# Patient Record
Sex: FEMALE | Race: OTHER | HISPANIC OR LATINO | URBAN - METROPOLITAN AREA
[De-identification: names, ages, dates, MRNs, and addresses within clinical notes are randomized per-mention and may not be internally consistent; named-entity substitution may affect disease eponyms.]

---

## 2017-01-13 ENCOUNTER — EMERGENCY (EMERGENCY)
Facility: HOSPITAL | Age: 22
LOS: 0 days | Discharge: HOME | End: 2017-01-14
Admitting: EMERGENCY MEDICINE
Payer: COMMERCIAL

## 2017-01-13 DIAGNOSIS — S16.1XXA STRAIN OF MUSCLE, FASCIA AND TENDON AT NECK LEVEL, INITIAL ENCOUNTER: ICD-10-CM
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
## 2017-01-13 PROCEDURE — 99283 EMERGENCY DEPT VISIT LOW MDM: CPT

## 2017-01-30 ENCOUNTER — APPOINTMENT (OUTPATIENT)
Dept: SURGERY | Facility: HOSPITAL | Age: 22
End: 2017-01-30

## 2017-02-06 ENCOUNTER — APPOINTMENT (OUTPATIENT)
Dept: SURGERY | Facility: HOSPITAL | Age: 22
End: 2017-02-06

## 2017-02-06 ENCOUNTER — OUTPATIENT (OUTPATIENT)
Dept: OUTPATIENT SERVICES | Facility: HOSPITAL | Age: 22
LOS: 1 days | End: 2017-02-06
Payer: SELF-PAY

## 2017-02-06 VITALS
HEIGHT: 62 IN | RESPIRATION RATE: 14 BRPM | DIASTOLIC BLOOD PRESSURE: 74 MMHG | WEIGHT: 150 LBS | HEART RATE: 87 BPM | BODY MASS INDEX: 27.6 KG/M2 | SYSTOLIC BLOOD PRESSURE: 123 MMHG

## 2017-02-06 DIAGNOSIS — R10.0 ACUTE ABDOMEN: ICD-10-CM

## 2017-02-06 PROCEDURE — G0463: CPT

## 2017-02-07 DIAGNOSIS — K80.20 CALCULUS OF GALLBLADDER WITHOUT CHOLECYSTITIS WITHOUT OBSTRUCTION: ICD-10-CM

## 2017-05-03 ENCOUNTER — OUTPATIENT (OUTPATIENT)
Dept: OUTPATIENT SERVICES | Facility: HOSPITAL | Age: 22
LOS: 1 days | End: 2017-05-03
Payer: COMMERCIAL

## 2017-05-03 ENCOUNTER — APPOINTMENT (OUTPATIENT)
Dept: ULTRASOUND IMAGING | Facility: CLINIC | Age: 22
End: 2017-05-03

## 2017-05-03 DIAGNOSIS — Z00.8 ENCOUNTER FOR OTHER GENERAL EXAMINATION: ICD-10-CM

## 2017-05-03 PROCEDURE — 76830 TRANSVAGINAL US NON-OB: CPT

## 2017-05-03 PROCEDURE — 76856 US EXAM PELVIC COMPLETE: CPT

## 2017-06-21 ENCOUNTER — ASOB RESULT (OUTPATIENT)
Age: 22
End: 2017-06-21

## 2017-06-21 ENCOUNTER — APPOINTMENT (OUTPATIENT)
Dept: ANTEPARTUM | Facility: CLINIC | Age: 22
End: 2017-06-21

## 2017-07-27 ENCOUNTER — TRANSCRIPTION ENCOUNTER (OUTPATIENT)
Age: 22
End: 2017-07-27

## 2017-08-04 ENCOUNTER — TRANSCRIPTION ENCOUNTER (OUTPATIENT)
Age: 22
End: 2017-08-04

## 2017-08-15 ENCOUNTER — APPOINTMENT (OUTPATIENT)
Dept: ANTEPARTUM | Facility: CLINIC | Age: 22
End: 2017-08-15
Payer: MEDICAID

## 2017-08-15 ENCOUNTER — ASOB RESULT (OUTPATIENT)
Age: 22
End: 2017-08-15

## 2017-08-15 PROCEDURE — 76811 OB US DETAILED SNGL FETUS: CPT

## 2017-08-24 ENCOUNTER — OUTPATIENT (OUTPATIENT)
Dept: OUTPATIENT SERVICES | Age: 22
LOS: 1 days | Discharge: HOME | End: 2017-08-24

## 2017-08-28 ENCOUNTER — APPOINTMENT (OUTPATIENT)
Dept: PEDIATRIC CARDIOLOGY | Facility: CLINIC | Age: 22
End: 2017-08-28
Payer: MEDICAID

## 2017-08-28 PROCEDURE — 76827 ECHO EXAM OF FETAL HEART: CPT

## 2017-08-28 PROCEDURE — 76825 ECHO EXAM OF FETAL HEART: CPT

## 2017-08-28 PROCEDURE — 93325 DOPPLER ECHO COLOR FLOW MAPG: CPT

## 2017-10-10 ENCOUNTER — APPOINTMENT (OUTPATIENT)
Dept: ANTEPARTUM | Facility: CLINIC | Age: 22
End: 2017-10-10
Payer: MEDICAID

## 2017-10-10 ENCOUNTER — ASOB RESULT (OUTPATIENT)
Age: 22
End: 2017-10-10

## 2017-10-10 PROCEDURE — 76816 OB US FOLLOW-UP PER FETUS: CPT

## 2017-10-26 ENCOUNTER — OUTPATIENT (OUTPATIENT)
Dept: INPATIENT UNIT | Facility: HOSPITAL | Age: 22
LOS: 1 days | Discharge: HOME | End: 2017-10-26

## 2017-10-26 DIAGNOSIS — O47.9 FALSE LABOR, UNSPECIFIED: ICD-10-CM

## 2017-12-01 ENCOUNTER — APPOINTMENT (OUTPATIENT)
Dept: ANTEPARTUM | Facility: CLINIC | Age: 22
End: 2017-12-01
Payer: MEDICAID

## 2017-12-01 PROBLEM — Z00.00 ENCOUNTER FOR PREVENTIVE HEALTH EXAMINATION: Noted: 2017-12-01

## 2017-12-01 PROCEDURE — 76816 OB US FOLLOW-UP PER FETUS: CPT

## 2017-12-01 PROCEDURE — 76818 FETAL BIOPHYS PROFILE W/NST: CPT

## 2017-12-05 ENCOUNTER — APPOINTMENT (OUTPATIENT)
Dept: ANTEPARTUM | Facility: CLINIC | Age: 22
End: 2017-12-05
Payer: MEDICAID

## 2017-12-05 ENCOUNTER — ASOB RESULT (OUTPATIENT)
Age: 22
End: 2017-12-05

## 2017-12-05 PROCEDURE — 76818 FETAL BIOPHYS PROFILE W/NST: CPT

## 2017-12-08 ENCOUNTER — APPOINTMENT (OUTPATIENT)
Dept: ANTEPARTUM | Facility: CLINIC | Age: 22
End: 2017-12-08
Payer: MEDICAID

## 2017-12-08 PROCEDURE — 76818 FETAL BIOPHYS PROFILE W/NST: CPT

## 2017-12-11 ENCOUNTER — INPATIENT (INPATIENT)
Facility: HOSPITAL | Age: 22
LOS: 2 days | Discharge: HOME | End: 2017-12-14
Attending: OBSTETRICS & GYNECOLOGY | Admitting: OBSTETRICS & GYNECOLOGY

## 2017-12-11 VITALS — HEIGHT: 63 IN | WEIGHT: 174.17 LBS

## 2017-12-11 LAB
ALBUMIN SERPL ELPH-MCNC: 2.4 G/DL — LOW (ref 3.3–5)
ALP SERPL-CCNC: 185 U/L — HIGH (ref 40–120)
ALT FLD-CCNC: 20 U/L — SIGNIFICANT CHANGE UP (ref 12–78)
ANION GAP SERPL CALC-SCNC: 7 MMOL/L — SIGNIFICANT CHANGE UP (ref 5–17)
AST SERPL-CCNC: 17 U/L — SIGNIFICANT CHANGE UP (ref 15–37)
BASOPHILS # BLD AUTO: 0 K/UL — SIGNIFICANT CHANGE UP (ref 0–0.2)
BASOPHILS NFR BLD AUTO: 0.4 % — SIGNIFICANT CHANGE UP (ref 0–2)
BILIRUB SERPL-MCNC: 0.3 MG/DL — SIGNIFICANT CHANGE UP (ref 0.2–1.2)
BLD GP AB SCN SERPL QL: SIGNIFICANT CHANGE UP
BUN SERPL-MCNC: 9 MG/DL — SIGNIFICANT CHANGE UP (ref 7–23)
CALCIUM SERPL-MCNC: 9 MG/DL — SIGNIFICANT CHANGE UP (ref 8.5–10.1)
CHLORIDE SERPL-SCNC: 107 MMOL/L — SIGNIFICANT CHANGE UP (ref 96–108)
CO2 SERPL-SCNC: 24 MMOL/L — SIGNIFICANT CHANGE UP (ref 22–31)
CREAT SERPL-MCNC: 0.56 MG/DL — SIGNIFICANT CHANGE UP (ref 0.5–1.3)
EOSINOPHIL # BLD AUTO: 0.1 K/UL — SIGNIFICANT CHANGE UP (ref 0–0.5)
EOSINOPHIL NFR BLD AUTO: 0.5 % — SIGNIFICANT CHANGE UP (ref 0–6)
GLUCOSE SERPL-MCNC: 88 MG/DL — SIGNIFICANT CHANGE UP (ref 70–99)
HCT VFR BLD CALC: 36 % — SIGNIFICANT CHANGE UP (ref 34.5–45)
HGB BLD-MCNC: 11.7 G/DL — SIGNIFICANT CHANGE UP (ref 11.5–15.5)
LYMPHOCYTES # BLD AUTO: 2.8 K/UL — SIGNIFICANT CHANGE UP (ref 1–3.3)
LYMPHOCYTES # BLD AUTO: 26.3 % — SIGNIFICANT CHANGE UP (ref 13–44)
MCHC RBC-ENTMCNC: 27.6 PG — SIGNIFICANT CHANGE UP (ref 27–34)
MCHC RBC-ENTMCNC: 32.4 GM/DL — SIGNIFICANT CHANGE UP (ref 32–36)
MCV RBC AUTO: 85.1 FL — SIGNIFICANT CHANGE UP (ref 80–100)
MONOCYTES # BLD AUTO: 0.4 K/UL — SIGNIFICANT CHANGE UP (ref 0–0.9)
MONOCYTES NFR BLD AUTO: 3.3 % — SIGNIFICANT CHANGE UP (ref 2–14)
NEUTROPHILS # BLD AUTO: 7.5 K/UL — HIGH (ref 1.8–7.4)
NEUTROPHILS NFR BLD AUTO: 69.5 % — SIGNIFICANT CHANGE UP (ref 43–77)
PLATELET # BLD AUTO: 332 K/UL — SIGNIFICANT CHANGE UP (ref 150–400)
POTASSIUM SERPL-MCNC: 4.8 MMOL/L — SIGNIFICANT CHANGE UP (ref 3.5–5.3)
POTASSIUM SERPL-SCNC: 4.8 MMOL/L — SIGNIFICANT CHANGE UP (ref 3.5–5.3)
PROT SERPL-MCNC: 6.7 GM/DL — SIGNIFICANT CHANGE UP (ref 6–8.3)
RBC # BLD: 4.24 M/UL — SIGNIFICANT CHANGE UP (ref 3.8–5.2)
RBC # FLD: 14 % — SIGNIFICANT CHANGE UP (ref 10.3–14.5)
SODIUM SERPL-SCNC: 138 MMOL/L — SIGNIFICANT CHANGE UP (ref 135–145)
TYPE + AB SCN PNL BLD: SIGNIFICANT CHANGE UP
WBC # BLD: 10.8 K/UL — HIGH (ref 3.8–10.5)
WBC # FLD AUTO: 10.8 K/UL — HIGH (ref 3.8–10.5)

## 2017-12-11 RX ORDER — SODIUM CHLORIDE 9 MG/ML
500 INJECTION, SOLUTION INTRAVENOUS ONCE
Qty: 0 | Refills: 0 | Status: COMPLETED | OUTPATIENT
Start: 2017-12-11 | End: 2017-12-11

## 2017-12-11 RX ORDER — SODIUM CHLORIDE 9 MG/ML
1000 INJECTION, SOLUTION INTRAVENOUS
Qty: 0 | Refills: 0 | Status: DISCONTINUED | OUTPATIENT
Start: 2017-12-11 | End: 2017-12-12

## 2017-12-11 RX ORDER — OXYTOCIN 10 UNIT/ML
333.33 VIAL (ML) INJECTION
Qty: 20 | Refills: 0 | Status: DISCONTINUED | OUTPATIENT
Start: 2017-12-11 | End: 2017-12-12

## 2017-12-11 RX ORDER — CITRIC ACID/SODIUM CITRATE 300-500 MG
15 SOLUTION, ORAL ORAL EVERY 4 HOURS
Qty: 0 | Refills: 0 | Status: DISCONTINUED | OUTPATIENT
Start: 2017-12-11 | End: 2017-12-12

## 2017-12-11 RX ADMIN — SODIUM CHLORIDE 1000 MILLILITER(S): 9 INJECTION, SOLUTION INTRAVENOUS at 20:17

## 2017-12-11 NOTE — PATIENT PROFILE OB - VAGINAL DELIVERY TYPE, BABY A
Return to work    10/5/2017      RE:    Dale Mendosa   [address]  [facility] [address]      This is to certify that Dale was seen in the clinic today and can return to regular work on 10-05-17.    Restrictions: none        Signature: __________________________________________________, 10/5/2017              Justin Grant MD  [facility]  [address]  [facility] [id number] [phone]     spontaneous

## 2017-12-12 LAB
ABO RH CONFIRMATION: SIGNIFICANT CHANGE UP
T PALLIDUM AB TITR SER: NEGATIVE — SIGNIFICANT CHANGE UP

## 2017-12-12 RX ORDER — DIBUCAINE 1 %
1 OINTMENT (GRAM) RECTAL EVERY 4 HOURS
Qty: 0 | Refills: 0 | Status: DISCONTINUED | OUTPATIENT
Start: 2017-12-12 | End: 2017-12-12

## 2017-12-12 RX ORDER — SODIUM CHLORIDE 9 MG/ML
1000 INJECTION, SOLUTION INTRAVENOUS
Qty: 0 | Refills: 0 | Status: DISCONTINUED | OUTPATIENT
Start: 2017-12-12 | End: 2017-12-12

## 2017-12-12 RX ORDER — SODIUM CHLORIDE 9 MG/ML
3 INJECTION INTRAMUSCULAR; INTRAVENOUS; SUBCUTANEOUS EVERY 8 HOURS
Qty: 0 | Refills: 0 | Status: DISCONTINUED | OUTPATIENT
Start: 2017-12-12 | End: 2017-12-12

## 2017-12-12 RX ORDER — HYDROCORTISONE 1 %
1 OINTMENT (GRAM) TOPICAL EVERY 4 HOURS
Qty: 0 | Refills: 0 | Status: DISCONTINUED | OUTPATIENT
Start: 2017-12-12 | End: 2017-12-12

## 2017-12-12 RX ORDER — PRAMOXINE HYDROCHLORIDE 150 MG/15G
1 AEROSOL, FOAM RECTAL EVERY 4 HOURS
Qty: 0 | Refills: 0 | Status: DISCONTINUED | OUTPATIENT
Start: 2017-12-12 | End: 2017-12-14

## 2017-12-12 RX ORDER — SODIUM CHLORIDE 9 MG/ML
3 INJECTION INTRAMUSCULAR; INTRAVENOUS; SUBCUTANEOUS EVERY 8 HOURS
Qty: 0 | Refills: 0 | Status: DISCONTINUED | OUTPATIENT
Start: 2017-12-12 | End: 2017-12-14

## 2017-12-12 RX ORDER — MAGNESIUM HYDROXIDE 400 MG/1
30 TABLET, CHEWABLE ORAL
Qty: 0 | Refills: 0 | Status: DISCONTINUED | OUTPATIENT
Start: 2017-12-12 | End: 2017-12-14

## 2017-12-12 RX ORDER — ACETAMINOPHEN 500 MG
650 TABLET ORAL EVERY 6 HOURS
Qty: 0 | Refills: 0 | Status: DISCONTINUED | OUTPATIENT
Start: 2017-12-12 | End: 2017-12-12

## 2017-12-12 RX ORDER — LANOLIN
1 OINTMENT (GRAM) TOPICAL EVERY 6 HOURS
Qty: 0 | Refills: 0 | Status: DISCONTINUED | OUTPATIENT
Start: 2017-12-12 | End: 2017-12-14

## 2017-12-12 RX ORDER — DIBUCAINE 1 %
1 OINTMENT (GRAM) RECTAL EVERY 4 HOURS
Qty: 0 | Refills: 0 | Status: DISCONTINUED | OUTPATIENT
Start: 2017-12-12 | End: 2017-12-14

## 2017-12-12 RX ORDER — OXYTOCIN 10 UNIT/ML
41.67 VIAL (ML) INJECTION
Qty: 20 | Refills: 0 | Status: DISCONTINUED | OUTPATIENT
Start: 2017-12-12 | End: 2017-12-12

## 2017-12-12 RX ORDER — HYDROCORTISONE 1 %
1 OINTMENT (GRAM) TOPICAL EVERY 4 HOURS
Qty: 0 | Refills: 0 | Status: DISCONTINUED | OUTPATIENT
Start: 2017-12-12 | End: 2017-12-14

## 2017-12-12 RX ORDER — IBUPROFEN 200 MG
600 TABLET ORAL EVERY 6 HOURS
Qty: 0 | Refills: 0 | Status: DISCONTINUED | OUTPATIENT
Start: 2017-12-12 | End: 2017-12-12

## 2017-12-12 RX ORDER — DOCUSATE SODIUM 100 MG
100 CAPSULE ORAL
Qty: 0 | Refills: 0 | Status: DISCONTINUED | OUTPATIENT
Start: 2017-12-12 | End: 2017-12-14

## 2017-12-12 RX ORDER — IBUPROFEN 200 MG
600 TABLET ORAL EVERY 6 HOURS
Qty: 0 | Refills: 0 | Status: DISCONTINUED | OUTPATIENT
Start: 2017-12-12 | End: 2017-12-14

## 2017-12-12 RX ORDER — AER TRAVELER 0.5 G/1
1 SOLUTION RECTAL; TOPICAL EVERY 4 HOURS
Qty: 0 | Refills: 0 | Status: DISCONTINUED | OUTPATIENT
Start: 2017-12-12 | End: 2017-12-14

## 2017-12-12 RX ORDER — DIPHENHYDRAMINE HCL 50 MG
25 CAPSULE ORAL EVERY 6 HOURS
Qty: 0 | Refills: 0 | Status: DISCONTINUED | OUTPATIENT
Start: 2017-12-12 | End: 2017-12-14

## 2017-12-12 RX ORDER — ACETAMINOPHEN 500 MG
650 TABLET ORAL EVERY 6 HOURS
Qty: 0 | Refills: 0 | Status: DISCONTINUED | OUTPATIENT
Start: 2017-12-12 | End: 2017-12-14

## 2017-12-12 RX ORDER — TETANUS TOXOID, REDUCED DIPHTHERIA TOXOID AND ACELLULAR PERTUSSIS VACCINE, ADSORBED 5; 2.5; 8; 8; 2.5 [IU]/.5ML; [IU]/.5ML; UG/.5ML; UG/.5ML; UG/.5ML
0.5 SUSPENSION INTRAMUSCULAR ONCE
Qty: 0 | Refills: 0 | Status: DISCONTINUED | OUTPATIENT
Start: 2017-12-12 | End: 2017-12-14

## 2017-12-12 RX ORDER — SIMETHICONE 80 MG/1
80 TABLET, CHEWABLE ORAL EVERY 6 HOURS
Qty: 0 | Refills: 0 | Status: DISCONTINUED | OUTPATIENT
Start: 2017-12-12 | End: 2017-12-14

## 2017-12-12 RX ORDER — PRAMOXINE HYDROCHLORIDE 150 MG/15G
1 AEROSOL, FOAM RECTAL EVERY 4 HOURS
Qty: 0 | Refills: 0 | Status: DISCONTINUED | OUTPATIENT
Start: 2017-12-12 | End: 2017-12-12

## 2017-12-12 RX ORDER — AER TRAVELER 0.5 G/1
1 SOLUTION RECTAL; TOPICAL EVERY 4 HOURS
Qty: 0 | Refills: 0 | Status: DISCONTINUED | OUTPATIENT
Start: 2017-12-12 | End: 2017-12-12

## 2017-12-12 RX ORDER — OXYTOCIN 10 UNIT/ML
41.67 VIAL (ML) INJECTION
Qty: 20 | Refills: 0 | Status: DISCONTINUED | OUTPATIENT
Start: 2017-12-12 | End: 2017-12-14

## 2017-12-12 RX ORDER — GLYCERIN ADULT
1 SUPPOSITORY, RECTAL RECTAL AT BEDTIME
Qty: 0 | Refills: 0 | Status: DISCONTINUED | OUTPATIENT
Start: 2017-12-12 | End: 2017-12-14

## 2017-12-12 RX ADMIN — Medication 100 MILLIGRAM(S): at 22:20

## 2017-12-12 RX ADMIN — Medication 600 MILLIGRAM(S): at 22:20

## 2017-12-12 RX ADMIN — SODIUM CHLORIDE 125 MILLILITER(S): 9 INJECTION, SOLUTION INTRAVENOUS at 04:23

## 2017-12-13 LAB
BASOPHILS # BLD AUTO: 0 K/UL — SIGNIFICANT CHANGE UP (ref 0–0.2)
BASOPHILS NFR BLD AUTO: 0.3 % — SIGNIFICANT CHANGE UP (ref 0–2)
EOSINOPHIL # BLD AUTO: 0.1 K/UL — SIGNIFICANT CHANGE UP (ref 0–0.5)
EOSINOPHIL NFR BLD AUTO: 0.6 % — SIGNIFICANT CHANGE UP (ref 0–6)
HCT VFR BLD CALC: 28.7 % — LOW (ref 34.5–45)
HGB BLD-MCNC: 9.3 G/DL — LOW (ref 11.5–15.5)
LYMPHOCYTES # BLD AUTO: 2.1 K/UL — SIGNIFICANT CHANGE UP (ref 1–3.3)
LYMPHOCYTES # BLD AUTO: 21.6 % — SIGNIFICANT CHANGE UP (ref 13–44)
MCHC RBC-ENTMCNC: 27.8 PG — SIGNIFICANT CHANGE UP (ref 27–34)
MCHC RBC-ENTMCNC: 32.3 GM/DL — SIGNIFICANT CHANGE UP (ref 32–36)
MCV RBC AUTO: 86.1 FL — SIGNIFICANT CHANGE UP (ref 80–100)
MONOCYTES # BLD AUTO: 0.5 K/UL — SIGNIFICANT CHANGE UP (ref 0–0.9)
MONOCYTES NFR BLD AUTO: 5.3 % — SIGNIFICANT CHANGE UP (ref 2–14)
NEUTROPHILS # BLD AUTO: 7.1 K/UL — SIGNIFICANT CHANGE UP (ref 1.8–7.4)
NEUTROPHILS NFR BLD AUTO: 72.1 % — SIGNIFICANT CHANGE UP (ref 43–77)
PLATELET # BLD AUTO: 239 K/UL — SIGNIFICANT CHANGE UP (ref 150–400)
RBC # BLD: 3.33 M/UL — LOW (ref 3.8–5.2)
RBC # FLD: 14.2 % — SIGNIFICANT CHANGE UP (ref 10.3–14.5)
WBC # BLD: 9.9 K/UL — SIGNIFICANT CHANGE UP (ref 3.8–10.5)
WBC # FLD AUTO: 9.9 K/UL — SIGNIFICANT CHANGE UP (ref 3.8–10.5)

## 2017-12-13 RX ADMIN — Medication 1 SUPPOSITORY(S): at 18:20

## 2017-12-13 RX ADMIN — Medication 1 TABLET(S): at 12:37

## 2017-12-13 RX ADMIN — Medication 100 MILLIGRAM(S): at 12:42

## 2017-12-13 NOTE — PROGRESS NOTE ADULT - SUBJECTIVE AND OBJECTIVE BOX
S: Pt is a 23 yo F, now  who is PPD #1 s/p . Pt is overall doing well. Reports to be OOB and ambulating without difficulty. Pt is voiding and passing flatus without difficulty, has not had a BM yet. Tolerating regular diet and pain is controlled with medications. Pt is breastfeeding and bottlefeeding.  Denies h/a, dizziness, fevers, changes in vision, CP, palpitations, SOB, n/v/d, or calf tenderness. No other complaints at this time.     O: Vital Signs Last 24 Hrs  T(C): 37.1 (12 Dec 2017 20:52), Max: 37.2 (12 Dec 2017 16:49)  T(F): 98.7 (12 Dec 2017 20:52), Max: 99 (12 Dec 2017 16:49)  HR: 95 (12 Dec 2017 20:52) (75 - 116)  BP: 114/64 (12 Dec 2017 20:52) (105/62 - 128/76)  BP(mean): 96 (12 Dec 2017 16:49) (77 - 96)  RR: 18 (12 Dec 2017 20:52) (16 - 18)  SpO2: 98% (12 Dec 2017 20:52) (98% - 99%)    PE:   Gen: WD/WN, resting comfortably in bed in NAD  Head: NC/AT  Heart: RRR, clear S1 S2, no murmurs, rubs or gallops  Respiratory: lungs CTA bilaterally, no wheezes, rales or rhonchi   Abd: soft, NT, ND, fundus firm below umbilicus  Ext: no tenderness, no LE edema, +pulses     Labs:                        11.7   10.8  )-----------( 332      ( 11 Dec 2017 20:58 )             36.0       A: 21yo F Stable PPD#1 s/p    normal day 1 exam  VSS  doing well  routine post partum care  if clinically stable, d/c for tomorrow

## 2017-12-14 ENCOUNTER — TRANSCRIPTION ENCOUNTER (OUTPATIENT)
Age: 22
End: 2017-12-14

## 2017-12-14 VITALS
OXYGEN SATURATION: 100 % | HEART RATE: 84 BPM | SYSTOLIC BLOOD PRESSURE: 119 MMHG | TEMPERATURE: 98 F | RESPIRATION RATE: 18 BRPM | DIASTOLIC BLOOD PRESSURE: 71 MMHG

## 2017-12-14 RX ORDER — ACETAMINOPHEN 500 MG
2 TABLET ORAL
Qty: 0 | Refills: 0 | DISCHARGE
Start: 2017-12-14

## 2017-12-14 RX ORDER — IBUPROFEN 200 MG
1 TABLET ORAL
Qty: 0 | Refills: 0 | DISCHARGE
Start: 2017-12-14

## 2017-12-14 RX ADMIN — Medication 100 MILLIGRAM(S): at 08:15

## 2017-12-14 NOTE — DISCHARGE NOTE OB - MEDICATION SUMMARY - MEDICATIONS TO TAKE
I will START or STAY ON the medications listed below when I get home from the hospital:    ibuprofen 600 mg oral tablet  -- 1 tab(s) by mouth every 6 hours, As needed, Moderate Pain  -- Indication: For pain    acetaminophen 325 mg oral tablet  -- 2 tab(s) by mouth every 6 hours, As needed, Mild Pain  -- Indication: For pain    acetaminophen 325 mg oral tablet  -- 2 tab(s) by mouth every 6 hours, As needed, For Temp greater than 38.5 C (101.3 F)  -- Indication: For fever

## 2017-12-14 NOTE — DISCHARGE NOTE OB - PATIENT PORTAL LINK FT
“You can access the FollowHealth Patient Portal, offered by [facility], by registering with the following website: [email or web address]”

## 2017-12-14 NOTE — DISCHARGE NOTE OB - CARE PROVIDER_API CALL
Viviana Medrano), Obstetrics and Gynecology  [address] [place] [address]  Phone: [phone]  Fax: [phone]

## 2017-12-14 NOTE — PROGRESS NOTE ADULT - SUBJECTIVE AND OBJECTIVE BOX
S: Pt is a 21 yo F, now  who is PPD#2 s/p . Pt is overall doing well. Reports to be OOB and ambulating without difficulty. Pt is voiding and passing flatus without difficulty, and states she had a BM yesterday. Tolerating regular diet and pain is controlled with medications. Pt is breastfeeding and bottlefeeding. Denies h/a, dizziness, fevers, changes in vision, CP, palpitations, SOB, n/v/d or calf tenderness. No other complaints at this time.    O: Vital Signs Last 24 Hrs  T(C): 37 (13 Dec 2017 20:00), Max: 37 (13 Dec 2017 20:00)  T(F): 98.6 (13 Dec 2017 20:00), Max: 98.6 (13 Dec 2017 20:00)  HR: 80 (13 Dec 2017 20:00) (80 - 91)  BP: 101/51 (13 Dec 2017 20:00) (101/51 - 108/65)  RR: 18 (13 Dec 2017 20:00) (18 - 18)  SpO2: 100% (13 Dec 2017 20:00) (99% - 100%)    PE:   Gen: WD/WN, resting comfortably in bed in NAD  Head: NC/AT  Heart: RRR, clear S1 S2, no murmurs, rubs or gallops  Respiratory: lungs CTA bilaterally, no wheezes, rales or rhonchi   Abd: soft, NT, ND, fundus firm below umbilicus  Ext: no tenderness, no LE edema, +pulses     Labs:                        9.3    9.9   )-----------( 239      ( 13 Dec 2017 06:45 )             28.7     A/P: 23yo F Stable PPD#2 s/p    normal day 2 exam  VSS  doing well  routine post partum care  stable for discharge

## 2017-12-14 NOTE — DISCHARGE NOTE OB - HOSPITAL COURSE
21 yo F, now  was admitted on  for induction of labor at 37 2/7 WGA due to intrahepatic cholestasis of pregnancy. Delivered via  on  with no complications. Patient is now postpartum day 2, without complicated hospital course. Patient feels wells and is stable for discharge.

## 2017-12-14 NOTE — DISCHARGE NOTE OB - MATERIALS PROVIDED
[facility] Hearing Screen Program/  Immunization Record/Shaken Baby Prevention Handout/Breastfeeding Guide and Packet/Vaccinations/Guide to Postpartum Care/[facility] Screening Program/Back To Sleep Handout

## 2017-12-14 NOTE — DISCHARGE NOTE OB - CARE PLAN
Principal Discharge DX:	 (normal spontaneous vaginal delivery)  Goal:	resolution of symptoms and prevention of pain  Instructions for follow-up, activity and diet:	f/u with OB and pediatrician within 1 week

## 2017-12-19 DIAGNOSIS — K83.1 OBSTRUCTION OF BILE DUCT: ICD-10-CM

## 2017-12-19 DIAGNOSIS — Z3A.37 37 WEEKS GESTATION OF PREGNANCY: ICD-10-CM

## 2018-03-30 ENCOUNTER — EMERGENCY (EMERGENCY)
Facility: HOSPITAL | Age: 23
LOS: 0 days | Discharge: HOME | End: 2018-03-30
Attending: EMERGENCY MEDICINE
Payer: MEDICAID

## 2018-03-30 VITALS
OXYGEN SATURATION: 100 % | HEIGHT: 62 IN | SYSTOLIC BLOOD PRESSURE: 126 MMHG | RESPIRATION RATE: 16 BRPM | DIASTOLIC BLOOD PRESSURE: 70 MMHG | TEMPERATURE: 98 F | HEART RATE: 82 BPM | WEIGHT: 162.92 LBS

## 2018-03-30 DIAGNOSIS — Z87.19 PERSONAL HISTORY OF OTHER DISEASES OF THE DIGESTIVE SYSTEM: ICD-10-CM

## 2018-03-30 DIAGNOSIS — R11.2 NAUSEA WITH VOMITING, UNSPECIFIED: ICD-10-CM

## 2018-03-30 DIAGNOSIS — R10.10 UPPER ABDOMINAL PAIN, UNSPECIFIED: ICD-10-CM

## 2018-03-30 DIAGNOSIS — K80.50 CALCULUS OF BILE DUCT WITHOUT CHOLANGITIS OR CHOLECYSTITIS WITHOUT OBSTRUCTION: ICD-10-CM

## 2018-03-30 LAB
ALBUMIN SERPL ELPH-MCNC: 3.5 G/DL — SIGNIFICANT CHANGE UP (ref 3.3–5)
ALP SERPL-CCNC: 68 U/L — SIGNIFICANT CHANGE UP (ref 40–120)
ALT FLD-CCNC: 34 U/L — SIGNIFICANT CHANGE UP (ref 12–78)
ANION GAP SERPL CALC-SCNC: 7 MMOL/L — SIGNIFICANT CHANGE UP (ref 5–17)
APPEARANCE UR: CLEAR — SIGNIFICANT CHANGE UP
AST SERPL-CCNC: 20 U/L — SIGNIFICANT CHANGE UP (ref 15–37)
BACTERIA # UR AUTO: (no result)
BASOPHILS # BLD AUTO: 0.03 K/UL — SIGNIFICANT CHANGE UP (ref 0–0.2)
BASOPHILS NFR BLD AUTO: 0.3 % — SIGNIFICANT CHANGE UP (ref 0–2)
BILIRUB SERPL-MCNC: 0.2 MG/DL — SIGNIFICANT CHANGE UP (ref 0.2–1.2)
BILIRUB UR-MCNC: NEGATIVE — SIGNIFICANT CHANGE UP
BUN SERPL-MCNC: 11 MG/DL — SIGNIFICANT CHANGE UP (ref 7–23)
CALCIUM SERPL-MCNC: 9.2 MG/DL — SIGNIFICANT CHANGE UP (ref 8.5–10.1)
CHLORIDE SERPL-SCNC: 106 MMOL/L — SIGNIFICANT CHANGE UP (ref 96–108)
CO2 SERPL-SCNC: 27 MMOL/L — SIGNIFICANT CHANGE UP (ref 22–31)
COLOR SPEC: YELLOW — SIGNIFICANT CHANGE UP
COMMENT - URINE: SIGNIFICANT CHANGE UP
CREAT SERPL-MCNC: 0.68 MG/DL — SIGNIFICANT CHANGE UP (ref 0.5–1.3)
DIFF PNL FLD: NEGATIVE — SIGNIFICANT CHANGE UP
EOSINOPHIL # BLD AUTO: 0.06 K/UL — SIGNIFICANT CHANGE UP (ref 0–0.5)
EOSINOPHIL NFR BLD AUTO: 0.6 % — SIGNIFICANT CHANGE UP (ref 0–6)
EPI CELLS # UR: (no result)
GLUCOSE SERPL-MCNC: 94 MG/DL — SIGNIFICANT CHANGE UP (ref 70–99)
GLUCOSE UR QL: NEGATIVE MG/DL — SIGNIFICANT CHANGE UP
HCG SERPL-ACNC: <1 MIU/ML — SIGNIFICANT CHANGE UP
HCT VFR BLD CALC: 37 % — SIGNIFICANT CHANGE UP (ref 34.5–45)
HGB BLD-MCNC: 12 G/DL — SIGNIFICANT CHANGE UP (ref 11.5–15.5)
IMM GRANULOCYTES NFR BLD AUTO: 0.2 % — SIGNIFICANT CHANGE UP (ref 0–1.5)
KETONES UR-MCNC: NEGATIVE — SIGNIFICANT CHANGE UP
LEUKOCYTE ESTERASE UR-ACNC: (no result)
LIDOCAIN IGE QN: 116 U/L — SIGNIFICANT CHANGE UP (ref 73–393)
LYMPHOCYTES # BLD AUTO: 3.43 K/UL — HIGH (ref 1–3.3)
LYMPHOCYTES # BLD AUTO: 34.2 % — SIGNIFICANT CHANGE UP (ref 13–44)
MCHC RBC-ENTMCNC: 27.8 PG — SIGNIFICANT CHANGE UP (ref 27–34)
MCHC RBC-ENTMCNC: 32.4 GM/DL — SIGNIFICANT CHANGE UP (ref 32–36)
MCV RBC AUTO: 85.8 FL — SIGNIFICANT CHANGE UP (ref 80–100)
MONOCYTES # BLD AUTO: 0.6 K/UL — SIGNIFICANT CHANGE UP (ref 0–0.9)
MONOCYTES NFR BLD AUTO: 6 % — SIGNIFICANT CHANGE UP (ref 2–14)
NEUTROPHILS # BLD AUTO: 5.88 K/UL — SIGNIFICANT CHANGE UP (ref 1.8–7.4)
NEUTROPHILS NFR BLD AUTO: 58.7 % — SIGNIFICANT CHANGE UP (ref 43–77)
NITRITE UR-MCNC: NEGATIVE — SIGNIFICANT CHANGE UP
NRBC # BLD: 0 /100 WBCS — SIGNIFICANT CHANGE UP (ref 0–0)
PH UR: 5 — SIGNIFICANT CHANGE UP (ref 5–8)
PLATELET # BLD AUTO: 349 K/UL — SIGNIFICANT CHANGE UP (ref 150–400)
POTASSIUM SERPL-MCNC: 3.8 MMOL/L — SIGNIFICANT CHANGE UP (ref 3.5–5.3)
POTASSIUM SERPL-SCNC: 3.8 MMOL/L — SIGNIFICANT CHANGE UP (ref 3.5–5.3)
PROT SERPL-MCNC: 7.8 GM/DL — SIGNIFICANT CHANGE UP (ref 6–8.3)
PROT UR-MCNC: NEGATIVE MG/DL — SIGNIFICANT CHANGE UP
RBC # BLD: 4.31 M/UL — SIGNIFICANT CHANGE UP (ref 3.8–5.2)
RBC # FLD: 14.3 % — SIGNIFICANT CHANGE UP (ref 10.3–14.5)
RBC CASTS # UR COMP ASSIST: SIGNIFICANT CHANGE UP /HPF (ref 0–4)
SODIUM SERPL-SCNC: 140 MMOL/L — SIGNIFICANT CHANGE UP (ref 135–145)
SP GR SPEC: 1.01 — SIGNIFICANT CHANGE UP (ref 1.01–1.02)
UROBILINOGEN FLD QL: NEGATIVE MG/DL — SIGNIFICANT CHANGE UP
WBC # BLD: 10.02 K/UL — SIGNIFICANT CHANGE UP (ref 3.8–10.5)
WBC # FLD AUTO: 10.02 K/UL — SIGNIFICANT CHANGE UP (ref 3.8–10.5)
WBC UR QL: SIGNIFICANT CHANGE UP

## 2018-03-30 PROCEDURE — 99284 EMERGENCY DEPT VISIT MOD MDM: CPT

## 2018-03-30 PROCEDURE — 76705 ECHO EXAM OF ABDOMEN: CPT | Mod: 26

## 2018-03-30 RX ORDER — ONDANSETRON 8 MG/1
4 TABLET, FILM COATED ORAL ONCE
Qty: 0 | Refills: 0 | Status: COMPLETED | OUTPATIENT
Start: 2018-03-30 | End: 2018-03-30

## 2018-03-30 RX ORDER — SODIUM CHLORIDE 9 MG/ML
3 INJECTION INTRAMUSCULAR; INTRAVENOUS; SUBCUTANEOUS ONCE
Qty: 0 | Refills: 0 | Status: COMPLETED | OUTPATIENT
Start: 2018-03-30 | End: 2018-03-30

## 2018-03-30 RX ORDER — SODIUM CHLORIDE 9 MG/ML
1000 INJECTION INTRAMUSCULAR; INTRAVENOUS; SUBCUTANEOUS ONCE
Qty: 0 | Refills: 0 | Status: COMPLETED | OUTPATIENT
Start: 2018-03-30 | End: 2018-03-30

## 2018-03-30 RX ORDER — FAMOTIDINE 10 MG/ML
20 INJECTION INTRAVENOUS ONCE
Qty: 0 | Refills: 0 | Status: COMPLETED | OUTPATIENT
Start: 2018-03-30 | End: 2018-03-30

## 2018-03-30 RX ADMIN — SODIUM CHLORIDE 1000 MILLILITER(S): 9 INJECTION INTRAMUSCULAR; INTRAVENOUS; SUBCUTANEOUS at 21:31

## 2018-03-30 RX ADMIN — SODIUM CHLORIDE 3 MILLILITER(S): 9 INJECTION INTRAMUSCULAR; INTRAVENOUS; SUBCUTANEOUS at 21:31

## 2018-03-30 RX ADMIN — FAMOTIDINE 20 MILLIGRAM(S): 10 INJECTION INTRAVENOUS at 21:30

## 2018-03-30 RX ADMIN — ONDANSETRON 4 MILLIGRAM(S): 8 TABLET, FILM COATED ORAL at 21:30

## 2018-03-30 NOTE — ED STATDOCS - PROGRESS NOTE DETAILS
Patient seen and evaluated.  Symtpoms have completely resolved at this time, abdomen is soft, NT, ND.  Reviewed all lab and sono findings with patient, no signs of an acute abdomen.  Reviewed with patient risks of gallstones and importance of outpatient follow up with a surgeon as well as strict return precautions for severe pain, fever and vomiting.  Patient verbalized understanding and agrees to follow up -Dalton Jones PA-C,

## 2018-03-30 NOTE — ED STATDOCS - ATTENDING CONTRIBUTION TO CARE
I, Ede Jansen MD, personally saw the patient with the PA, and completed the key components of the history and physical exam. I then discussed the management plan with the PA.

## 2018-03-30 NOTE — ED ADULT NURSE NOTE - OBJECTIVE STATEMENT
diffuse upper abd pain. states pmhx gallstones & was told to f/u with surgery but then became pregnant. Currently 3 months postpartum, not breastfeeding

## 2018-03-30 NOTE — ED STATDOCS - OBJECTIVE STATEMENT
21 y/o female with a PMHx of gallstones presents to the ED c/o severe abd pain, nausea, vomiting at 7:00pm tonight. Pt ate dinner at 5:30pm and then had suddent onset of severe abd pain, N/V. Pain is in upper abdomen, described as burning and radiates to the back. No dysuria, hematuria. Movement, walking makes pain worse. Pain feels similar to previous gallstone pain. NKDA. PMD: [facility]. 21 y/o female with a PMHx of gallstones presents to the ED c/o severe abd pain, nausea, vomiting at 7:00pm tonight. Pt ate dinner at 5:30pm and then had sudden onset of severe abd pain, N/V. Pain is in upper abdomen, described as burning and radiates to the back. No dysuria, hematuria. Movement, walking makes pain worse. Pain feels similar to previous gallstone pain. NKDA. PMD: [facility].

## 2018-03-30 NOTE — ED STATDOCS - CONSTITUTIONAL, MLM
normal...  female alert, +acutely ill due to pain, nausea, vomiting. Vomiting food material during evaluation.

## 2018-03-30 NOTE — ED STATDOCS - MEDICAL DECISION MAKING DETAILS
21 y/o  F h/o gallstones ambulatory to ED with upper abd pain, N/V 1-2 hours post meal. +RUQ epigastric tenderness, clinical Pritchard's.  Plan for labs, IV fluids, IV Zofran, Pepcid, consider IV pain meds, RUQ abdominal sono, observe and reassess.

## 2018-04-01 ENCOUNTER — OUTPATIENT (OUTPATIENT)
Dept: OUTPATIENT SERVICES | Facility: HOSPITAL | Age: 23
LOS: 1 days | End: 2018-04-01
Payer: MEDICAID

## 2018-04-01 PROCEDURE — G9001: CPT

## 2018-04-20 DIAGNOSIS — R69 ILLNESS, UNSPECIFIED: ICD-10-CM

## 2018-06-14 ENCOUNTER — RESULT REVIEW (OUTPATIENT)
Age: 23
End: 2018-06-14

## 2018-06-20 ENCOUNTER — OUTPATIENT (OUTPATIENT)
Dept: OUTPATIENT SERVICES | Facility: HOSPITAL | Age: 23
LOS: 1 days | End: 2018-06-20
Payer: MEDICAID

## 2018-06-20 ENCOUNTER — APPOINTMENT (OUTPATIENT)
Dept: ULTRASOUND IMAGING | Facility: CLINIC | Age: 23
End: 2018-06-20
Payer: MEDICAID

## 2018-06-20 DIAGNOSIS — Z00.8 ENCOUNTER FOR OTHER GENERAL EXAMINATION: ICD-10-CM

## 2018-06-20 PROCEDURE — 76641 ULTRASOUND BREAST COMPLETE: CPT | Mod: 26,RT

## 2018-06-20 PROCEDURE — 76641 ULTRASOUND BREAST COMPLETE: CPT

## 2018-07-17 PROBLEM — K80.20 CALCULUS OF GALLBLADDER WITHOUT CHOLECYSTITIS WITHOUT OBSTRUCTION: Chronic | Status: INACTIVE | Noted: 2018-03-30 | Resolved: 2018-04-04

## 2018-07-30 ENCOUNTER — TRANSCRIPTION ENCOUNTER (OUTPATIENT)
Age: 23
End: 2018-07-30

## 2019-03-06 ENCOUNTER — EMERGENCY (EMERGENCY)
Facility: HOSPITAL | Age: 24
LOS: 0 days | Discharge: HOME | End: 2019-03-06
Attending: STUDENT IN AN ORGANIZED HEALTH CARE EDUCATION/TRAINING PROGRAM | Admitting: STUDENT IN AN ORGANIZED HEALTH CARE EDUCATION/TRAINING PROGRAM
Payer: MEDICAID

## 2019-03-06 VITALS
DIASTOLIC BLOOD PRESSURE: 73 MMHG | HEART RATE: 79 BPM | HEIGHT: 62 IN | WEIGHT: 169.98 LBS | TEMPERATURE: 98 F | SYSTOLIC BLOOD PRESSURE: 125 MMHG | OXYGEN SATURATION: 99 % | RESPIRATION RATE: 15 BRPM

## 2019-03-06 VITALS — WEIGHT: 169.98 LBS | HEIGHT: 62 IN

## 2019-03-06 DIAGNOSIS — S16.1XXA STRAIN OF MUSCLE, FASCIA AND TENDON AT NECK LEVEL, INITIAL ENCOUNTER: ICD-10-CM

## 2019-03-06 DIAGNOSIS — M54.2 CERVICALGIA: ICD-10-CM
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
## 2019-03-06 PROCEDURE — 99284 EMERGENCY DEPT VISIT MOD MDM: CPT | Mod: 25

## 2019-03-06 RX ORDER — KETOROLAC TROMETHAMINE 30 MG/ML
30 SYRINGE (ML) INJECTION ONCE
Qty: 0 | Refills: 0 | Status: DISCONTINUED | OUTPATIENT
Start: 2019-03-06 | End: 2019-03-06

## 2019-03-06 RX ADMIN — Medication 30 MILLIGRAM(S): at 07:56

## 2019-03-06 NOTE — ED PROVIDER NOTE - MUSCULOSKELETAL, MLM
Spine appears normal, range of motion is not limited, (+) mild right sided paravertebral cervical tenderness;

## 2019-03-06 NOTE — ED PROVIDER NOTE - CPE EDP MUSC NORM
After Visit Summary   10/22/2017    Angel Sanders    MRN: [id number]           Patient Information     Date Of Birth          1951        Visit Information        Provider Department      10/22/2017 4:40 PM Ashley Gann APRN CNP [facility]        Today's Diagnoses     Acute maxillary sinusitis, recurrence not specified    -  1      Care Instructions      Sinusitis (Antibiotic Treatment)    The sinuses are air-filled spaces within the bones of the face. They connect to the inside of the nose. Sinusitis is an inflammation of the tissue lining the sinus cavity. Sinus inflammation can occur during a cold. It can also be due to allergies to pollens and other particles in the air. Sinusitis can cause symptoms of sinus congestion and fullness. A sinus infection causes fever, headache and facial pain. There is often green or yellow drainage from the nose or into the back of the throat (post-nasal drip). You have been given antibiotics to treat this condition.  Home care:    Take the full course of antibiotics as instructed. Do not stop taking them, even if you feel better.    Drink plenty of water, hot tea, and other liquids. This may help thin mucus. It also may promote sinus drainage.    Heat may help soothe painful areas of the face. Use a towel soaked in hot water. Or,  the shower and direct the hot spray onto your face. Using a vaporizer along with a menthol rub at night may also help.     An expectorant containing guaifenesin may help thin the mucus and promote drainage from the sinuses.    Over-the-counter decongestants may be used unless a similar medicine was prescribed. Nasal sprays work the fastest. Use one that contains phenylephrine or oxymetazoline. First blow the nose gently. Then use the spray. Do not use these medicines more often than directed on the label or symptoms may get worse. You may also use tablets containing pseudoephedrine.  Avoid products that combine ingredients, because side effects may be increased. Read labels. You can also ask the pharmacist for help. (NOTE: Persons with high blood pressure should not use decongestants. They can raise blood pressure.)    Over-the-counter antihistamines may help if allergies contributed to your sinusitis.      Do not use nasal rinses or irrigation during an acute sinus infection, unless told to by your health care provider. Rinsing may spread the infection to other sinuses.    Use acetaminophen or ibuprofen to control pain, unless another pain medicine was prescribed. (If you have chronic liver or kidney disease or ever had a stomach ulcer, talk with your doctor before using these medicines. Aspirin should never be used in anyone under 18 years of age who is ill with a fever. It may cause severe liver damage.)    Don't smoke. This can worsen symptoms.  Follow-up care  Follow up with your healthcare provider or our staff if you are not improving within the next week.  When to seek medical advice  Call your healthcare provider if any of these occur:    Facial pain or headache becoming more severe    Stiff neck    Unusual drowsiness or confusion    Swelling of the forehead or eyelids    Vision problems, including blurred or double vision    Fever of 100.4 F (38 C) or higher, or as directed by your healthcare provider    Seizure    Breathing problems    Symptoms not resolving within 10 days  Date Last Reviewed: 4/13/2015 2000-2017 [employer]. [address] [place] [address], [place] [address]. All rights reserved. This information is not intended as a substitute for professional medical care. Always follow your healthcare professional's instructions.                Follow-ups after your visit        Who to contact     If you have questions or need follow up information about today's clinic visit or your schedule please contact [facility] directly at  "[phone].  Normal or non-critical lab and imaging results will be communicated to you by [employer], letter or phone within 4 business days after the clinic has received the results. If you do not hear from us within 7 days, please contact the clinic through [employer] or phone. If you have a critical or abnormal lab result, we will notify you by phone as soon as possible.  Submit refill requests through [employer] or call your pharmacy and they will forward the refill request to us. Please allow 3 business days for your refill to be completed.          Additional Information About Your Visit        [employer] Information     [employer] lets you send messages to your doctor, view your test results, renew your prescriptions, schedule appointments and more. To sign up, go to [email or web address] . Click on \"Log in\" on the left side of the screen, which will take you to the Welcome page. Then click on \"Sign up Now\" on the right side of the page.     You will be asked to enter the access code listed below, as well as some personal information. Please follow the directions to create your username and password.     Your access code is: [email or web address]  Expires: 2018  5:34 PM     Your access code will  in 90 days. If you need help or a new code, please call your [facility] clinic or [phone].        Care EveryWhere ID     This is your Care EveryWhere ID. This could be used by other organizations to access your [facility] medical records  [id number]        Your Vitals Were     Pulse Temperature Pulse Oximetry BMI (Body Mass Index)          78 98.3  F (36.8  C) (Oral) 96% 36.25 kg/m2         Blood Pressure from Last 3 Encounters:   10/22/17 145/70   17 136/78   17 128/70    Weight from Last 3 Encounters:   10/22/17 211 lb 3.2 oz (95.8 kg)   17 208 lb (94.3 kg)   17 205 lb (93 kg)              Today, you had the following     No orders found for display         Today's Medication Changes        "   These changes are accurate as of: 10/22/17  5:34 PM.  If you have any questions, ask your nurse or doctor.               Start taking these medicines.        Dose/Directions    amoxicillin-clavulanate 875-125 MG per tablet   Commonly known as:  AUGMENTIN   Used for:  Acute maxillary sinusitis, recurrence not specified   Started by:  Ashley Gann APRN CNP        Dose:  1 tablet   Take 1 tablet by mouth 2 times daily   Quantity:  20 tablet   Refills:  0       trimethoprim-polymyxin b ophthalmic solution   Commonly known as:  POLYTRIM   Used for:  Acute maxillary sinusitis, recurrence not specified   Started by:  Ashley Gann APRN CNP        Dose:  1 drop   Apply 1 drop to eye every 3 hours for 7 days   Quantity:  1 Bottle   Refills:  0            Where to get your medicines      These medications were sent to [address] [address]     Phone:  [phone]     amoxicillin-clavulanate 875-125 MG per tablet    trimethoprim-polymyxin b ophthalmic solution                Primary Care Provider Office Phone # Fax #    Tony Peter [phone]       [address] [facility] [address]        Equal Access to Services     CUONG HUYNH AH: Hadii brendan ku hadasho Soomaali, waaxda luqadaha, qaybta kaalmada adeegyada, waxay idiin haytariqn rand mae. So [facility] [phone].    ATENCIÓN: Si habla español, tiene a perez disposición servicios gratuitos de asistencia lingüística. Llame al [phone].    We comply with applicable federal civil rights laws and [place] laws. We do not discriminate on the basis of race, color, national origin, age, disability, sex, sexual orientation, or gender identity.            Thank you!     Thank you for choosing [facility]  for your care. Our goal is always to provide you with excellent care. Hearing back from our patients is one  way we can continue to improve our services. Please take a few minutes to complete the written survey that you may receive in the mail after your visit with us. Thank you!             Your Updated Medication List - Protect others around you: Learn how to safely use, store and throw away your medicines at www.disposemymeds.org.          This list is accurate as of: 10/22/17  5:34 PM.  Always use your most recent med list.                   Brand Name Dispense Instructions for use Diagnosis    amoxicillin-clavulanate 875-125 MG per tablet    AUGMENTIN    20 tablet    Take 1 tablet by mouth 2 times daily    Acute maxillary sinusitis, recurrence not specified       aspirin 81 MG tablet      1 TABLET DAILY        atorvastatin 80 MG tablet    LIPITOR    90 tablet    Take 1 tablet (80 mg) by mouth daily    High cholesterol       folic acid-vit B6-vit B12 0.8-10-0.115 MG Tabs per tablet    FOLGARD     Take 1 tablet by mouth daily        IRON SUPPLEMENT PO      Take 325 mg by mouth daily (with breakfast)        lisinopril 5 MG tablet    PRINIVIL/ZESTRIL    90 tablet    Take 1 tablet (5 mg) by mouth daily    Essential hypertension, benign       methocarbamol 750 MG tablet    ROBAXIN    30 tablet    Take 1 tablet (750 mg) by mouth 3 times daily as needed for muscle spasms    Neck pain       metoprolol 25 MG tablet    LOPRESSOR    180 tablet    Take 1 tablet (25 mg) by mouth 2 times daily    Essential hypertension, benign       Multi-vitamin Tabs tablet      Take 1 tablet by mouth daily        nitroGLYcerin 0.4 MG sublingual tablet    NITROSTAT    25 tablet    Place 1 tablet (0.4 mg) under the tongue every 5 minutes as needed May repeat X 2 and if chest pain persists, call 911    Coronary atherosclerosis of unspecified type of vessel, native or graft       trimethoprim-polymyxin b ophthalmic solution    POLYTRIM    1 Bottle    Apply 1 drop to eye every 3 hours for 7 days    Acute maxillary sinusitis, recurrence not specified     normal...

## 2019-03-06 NOTE — ED ADULT NURSE NOTE - GENITOURINARY ASSESSMENT
WDL Awake/No adverse reaction to first time med in ED/Alert and oriented to person, place and time/Symptoms improved

## 2019-03-06 NOTE — ED PROVIDER NOTE - CLINICAL SUMMARY MEDICAL DECISION MAKING FREE TEXT BOX
22 yo female with right sided neck pain; atraumatic; no red flags; likely msk; patient denies pregnancy; toradol; warm compresses- given in ED; motrin 600mg tid prn pain; f/u with pmd in 1-2 days without fail; strict return precautions given.

## 2019-03-06 NOTE — ED PROVIDER NOTE - NEUROLOGICAL, MLM
Alert and oriented, no focal deficits, no motor or sensory deficits- strength 5/5 in ue/le bilaterally;

## 2019-03-06 NOTE — ED ADULT NURSE NOTE - IN THE PAST YEAR, HOW OFTEN HAVE YOU USED TOBACCO PRODUCTS?
Showering allowed/Walking-Outdoors allowed/Do not drive or operate machinery/Walking-Indoors allowed/No Heavy lifting/straining
Never

## 2019-03-06 NOTE — ED PROVIDER NOTE - OBJECTIVE STATEMENT
Patient is a 24 yo female with right sided neck pain since awakening this AM: patient reports pain with movement; no numbness or tingling- contrary to triage; no weakness; no headache; no nausea or vomiting; no fever or chills; no trauma or injury; did not take anything prior to arrival for pain.

## 2019-03-07 PROBLEM — K80.20 CALCULUS OF GALLBLADDER WITHOUT CHOLECYSTITIS WITHOUT OBSTRUCTION: Chronic | Status: ACTIVE | Noted: 2018-04-04

## 2019-07-24 ENCOUNTER — RESULT REVIEW (OUTPATIENT)
Age: 24
End: 2019-07-24

## 2019-12-02 ENCOUNTER — RESULT REVIEW (OUTPATIENT)
Age: 24
End: 2019-12-02

## 2019-12-02 ENCOUNTER — INPATIENT (INPATIENT)
Facility: HOSPITAL | Age: 24
LOS: 0 days | Discharge: HOME | DRG: 263 | End: 2019-12-03
Attending: SURGERY | Admitting: SURGERY
Payer: MEDICAID

## 2019-12-02 VITALS — HEIGHT: 62 IN | WEIGHT: 164.91 LBS

## 2019-12-02 DIAGNOSIS — K81.9 CHOLECYSTITIS, UNSPECIFIED: ICD-10-CM

## 2019-12-02 LAB
ALBUMIN SERPL ELPH-MCNC: 2.9 G/DL — LOW (ref 3.3–5)
ALP SERPL-CCNC: 75 U/L — SIGNIFICANT CHANGE UP (ref 40–120)
ALT FLD-CCNC: 16 U/L — SIGNIFICANT CHANGE UP (ref 12–78)
ANION GAP SERPL CALC-SCNC: 5 MMOL/L — SIGNIFICANT CHANGE UP (ref 5–17)
APPEARANCE UR: CLEAR — SIGNIFICANT CHANGE UP
APPEARANCE UR: CLEAR — SIGNIFICANT CHANGE UP
AST SERPL-CCNC: 8 U/L — LOW (ref 15–37)
BASE EXCESS BLDV CALC-SCNC: -0.4 MMOL/L — SIGNIFICANT CHANGE UP (ref -2–2)
BASOPHILS # BLD AUTO: 0.04 K/UL — SIGNIFICANT CHANGE UP (ref 0–0.2)
BASOPHILS NFR BLD AUTO: 0.2 % — SIGNIFICANT CHANGE UP (ref 0–2)
BILIRUB SERPL-MCNC: 0.2 MG/DL — SIGNIFICANT CHANGE UP (ref 0.2–1.2)
BILIRUB UR-MCNC: NEGATIVE — SIGNIFICANT CHANGE UP
BILIRUB UR-MCNC: NEGATIVE — SIGNIFICANT CHANGE UP
BUN SERPL-MCNC: 11 MG/DL — SIGNIFICANT CHANGE UP (ref 7–23)
CALCIUM SERPL-MCNC: 8.6 MG/DL — SIGNIFICANT CHANGE UP (ref 8.5–10.1)
CHLORIDE SERPL-SCNC: 109 MMOL/L — HIGH (ref 96–108)
CO2 SERPL-SCNC: 24 MMOL/L — SIGNIFICANT CHANGE UP (ref 22–31)
COLOR SPEC: YELLOW — SIGNIFICANT CHANGE UP
COLOR SPEC: YELLOW — SIGNIFICANT CHANGE UP
CREAT SERPL-MCNC: 0.75 MG/DL — SIGNIFICANT CHANGE UP (ref 0.5–1.3)
DIFF PNL FLD: ABNORMAL
DIFF PNL FLD: ABNORMAL
EOSINOPHIL # BLD AUTO: 0.02 K/UL — SIGNIFICANT CHANGE UP (ref 0–0.5)
EOSINOPHIL NFR BLD AUTO: 0.1 % — SIGNIFICANT CHANGE UP (ref 0–6)
GLUCOSE SERPL-MCNC: 107 MG/DL — HIGH (ref 70–99)
GLUCOSE UR QL: NEGATIVE MG/DL — SIGNIFICANT CHANGE UP
GLUCOSE UR QL: NEGATIVE MG/DL — SIGNIFICANT CHANGE UP
HCG SERPL-ACNC: <1 MIU/ML — SIGNIFICANT CHANGE UP
HCO3 BLDV-SCNC: 24 MMOL/L — SIGNIFICANT CHANGE UP (ref 21–29)
HCT VFR BLD CALC: 37.9 % — SIGNIFICANT CHANGE UP (ref 34.5–45)
HGB BLD-MCNC: 12.1 G/DL — SIGNIFICANT CHANGE UP (ref 11.5–15.5)
IMM GRANULOCYTES NFR BLD AUTO: 0.5 % — SIGNIFICANT CHANGE UP (ref 0–1.5)
KETONES UR-MCNC: NEGATIVE — SIGNIFICANT CHANGE UP
KETONES UR-MCNC: NEGATIVE — SIGNIFICANT CHANGE UP
LACTATE SERPL-SCNC: 0.8 MMOL/L — SIGNIFICANT CHANGE UP (ref 0.7–2)
LEUKOCYTE ESTERASE UR-ACNC: ABNORMAL
LEUKOCYTE ESTERASE UR-ACNC: NEGATIVE — SIGNIFICANT CHANGE UP
LIDOCAIN IGE QN: 53 U/L — LOW (ref 73–393)
LYMPHOCYTES # BLD AUTO: 1.36 K/UL — SIGNIFICANT CHANGE UP (ref 1–3.3)
LYMPHOCYTES # BLD AUTO: 6.3 % — LOW (ref 13–44)
MCHC RBC-ENTMCNC: 28 PG — SIGNIFICANT CHANGE UP (ref 27–34)
MCHC RBC-ENTMCNC: 31.9 GM/DL — LOW (ref 32–36)
MCV RBC AUTO: 87.7 FL — SIGNIFICANT CHANGE UP (ref 80–100)
MONOCYTES # BLD AUTO: 1 K/UL — HIGH (ref 0–0.9)
MONOCYTES NFR BLD AUTO: 4.7 % — SIGNIFICANT CHANGE UP (ref 2–14)
NEUTROPHILS # BLD AUTO: 18.97 K/UL — HIGH (ref 1.8–7.4)
NEUTROPHILS NFR BLD AUTO: 88.2 % — HIGH (ref 43–77)
NITRITE UR-MCNC: NEGATIVE — SIGNIFICANT CHANGE UP
NITRITE UR-MCNC: NEGATIVE — SIGNIFICANT CHANGE UP
PCO2 BLDV: 40 MMHG — SIGNIFICANT CHANGE UP (ref 35–50)
PH BLDV: 7.39 — SIGNIFICANT CHANGE UP (ref 7.35–7.45)
PH UR: 6 — SIGNIFICANT CHANGE UP (ref 5–8)
PH UR: 6 — SIGNIFICANT CHANGE UP (ref 5–8)
PLATELET # BLD AUTO: 289 K/UL — SIGNIFICANT CHANGE UP (ref 150–400)
PO2 BLDV: 53 MMHG — HIGH (ref 25–45)
POTASSIUM SERPL-MCNC: 3.8 MMOL/L — SIGNIFICANT CHANGE UP (ref 3.5–5.3)
POTASSIUM SERPL-SCNC: 3.8 MMOL/L — SIGNIFICANT CHANGE UP (ref 3.5–5.3)
PROT SERPL-MCNC: 7.2 GM/DL — SIGNIFICANT CHANGE UP (ref 6–8.3)
PROT UR-MCNC: 30 MG/DL
PROT UR-MCNC: NEGATIVE MG/DL — SIGNIFICANT CHANGE UP
RBC # BLD: 4.32 M/UL — SIGNIFICANT CHANGE UP (ref 3.8–5.2)
RBC # FLD: 13.2 % — SIGNIFICANT CHANGE UP (ref 10.3–14.5)
SAO2 % BLDV: 86 % — SIGNIFICANT CHANGE UP (ref 67–88)
SODIUM SERPL-SCNC: 138 MMOL/L — SIGNIFICANT CHANGE UP (ref 135–145)
SP GR SPEC: 1 — LOW (ref 1.01–1.02)
SP GR SPEC: 1.01 — SIGNIFICANT CHANGE UP (ref 1.01–1.02)
UROBILINOGEN FLD QL: NEGATIVE MG/DL — SIGNIFICANT CHANGE UP
UROBILINOGEN FLD QL: NEGATIVE MG/DL — SIGNIFICANT CHANGE UP
WBC # BLD: 21.5 K/UL — HIGH (ref 3.8–10.5)
WBC # FLD AUTO: 21.5 K/UL — HIGH (ref 3.8–10.5)

## 2019-12-02 PROCEDURE — 74177 CT ABD & PELVIS W/CONTRAST: CPT | Mod: 26

## 2019-12-02 PROCEDURE — C9113: CPT

## 2019-12-02 PROCEDURE — C1889: CPT

## 2019-12-02 PROCEDURE — 88304 TISSUE EXAM BY PATHOLOGIST: CPT

## 2019-12-02 PROCEDURE — 88304 TISSUE EXAM BY PATHOLOGIST: CPT | Mod: 26

## 2019-12-02 PROCEDURE — 76830 TRANSVAGINAL US NON-OB: CPT | Mod: 26

## 2019-12-02 PROCEDURE — 76705 ECHO EXAM OF ABDOMEN: CPT | Mod: 26

## 2019-12-02 RX ORDER — OXYCODONE AND ACETAMINOPHEN 5; 325 MG/1; MG/1
1 TABLET ORAL EVERY 4 HOURS
Refills: 0 | Status: DISCONTINUED | OUTPATIENT
Start: 2019-12-02 | End: 2019-12-03

## 2019-12-02 RX ORDER — SODIUM CHLORIDE 9 MG/ML
1000 INJECTION INTRAMUSCULAR; INTRAVENOUS; SUBCUTANEOUS ONCE
Refills: 0 | Status: COMPLETED | OUTPATIENT
Start: 2019-12-02 | End: 2019-12-02

## 2019-12-02 RX ORDER — PANTOPRAZOLE SODIUM 20 MG/1
40 TABLET, DELAYED RELEASE ORAL DAILY
Refills: 0 | Status: DISCONTINUED | OUTPATIENT
Start: 2019-12-02 | End: 2019-12-03

## 2019-12-02 RX ORDER — SODIUM CHLORIDE 9 MG/ML
500 INJECTION INTRAMUSCULAR; INTRAVENOUS; SUBCUTANEOUS ONCE
Refills: 0 | Status: COMPLETED | OUTPATIENT
Start: 2019-12-02 | End: 2019-12-02

## 2019-12-02 RX ORDER — SODIUM CHLORIDE 9 MG/ML
1000 INJECTION INTRAMUSCULAR; INTRAVENOUS; SUBCUTANEOUS
Refills: 0 | Status: DISCONTINUED | OUTPATIENT
Start: 2019-12-02 | End: 2019-12-03

## 2019-12-02 RX ORDER — ENOXAPARIN SODIUM 100 MG/ML
40 INJECTION SUBCUTANEOUS DAILY
Refills: 0 | Status: DISCONTINUED | OUTPATIENT
Start: 2019-12-02 | End: 2019-12-02

## 2019-12-02 RX ORDER — SODIUM CHLORIDE 9 MG/ML
1000 INJECTION INTRAMUSCULAR; INTRAVENOUS; SUBCUTANEOUS
Refills: 0 | Status: DISCONTINUED | OUTPATIENT
Start: 2019-12-02 | End: 2019-12-02

## 2019-12-02 RX ORDER — OXYCODONE HYDROCHLORIDE 5 MG/1
10 TABLET ORAL ONCE
Refills: 0 | Status: DISCONTINUED | OUTPATIENT
Start: 2019-12-02 | End: 2019-12-02

## 2019-12-02 RX ORDER — ACETAMINOPHEN 500 MG
1000 TABLET ORAL ONCE
Refills: 0 | Status: DISCONTINUED | OUTPATIENT
Start: 2019-12-02 | End: 2019-12-03

## 2019-12-02 RX ORDER — PIPERACILLIN AND TAZOBACTAM 4; .5 G/20ML; G/20ML
3.38 INJECTION, POWDER, LYOPHILIZED, FOR SOLUTION INTRAVENOUS ONCE
Refills: 0 | Status: COMPLETED | OUTPATIENT
Start: 2019-12-02 | End: 2019-12-02

## 2019-12-02 RX ORDER — ACETAMINOPHEN 500 MG
975 TABLET ORAL ONCE
Refills: 0 | Status: COMPLETED | OUTPATIENT
Start: 2019-12-02 | End: 2019-12-02

## 2019-12-02 RX ORDER — FENTANYL CITRATE 50 UG/ML
50 INJECTION INTRAVENOUS
Refills: 0 | Status: DISCONTINUED | OUTPATIENT
Start: 2019-12-02 | End: 2019-12-02

## 2019-12-02 RX ORDER — MORPHINE SULFATE 50 MG/1
2 CAPSULE, EXTENDED RELEASE ORAL EVERY 4 HOURS
Refills: 0 | Status: DISCONTINUED | OUTPATIENT
Start: 2019-12-02 | End: 2019-12-03

## 2019-12-02 RX ORDER — ONDANSETRON 8 MG/1
4 TABLET, FILM COATED ORAL EVERY 6 HOURS
Refills: 0 | Status: DISCONTINUED | OUTPATIENT
Start: 2019-12-02 | End: 2019-12-03

## 2019-12-02 RX ORDER — HYDROMORPHONE HYDROCHLORIDE 2 MG/ML
0.5 INJECTION INTRAMUSCULAR; INTRAVENOUS; SUBCUTANEOUS
Refills: 0 | Status: DISCONTINUED | OUTPATIENT
Start: 2019-12-02 | End: 2019-12-02

## 2019-12-02 RX ORDER — ONDANSETRON 8 MG/1
4 TABLET, FILM COATED ORAL ONCE
Refills: 0 | Status: COMPLETED | OUTPATIENT
Start: 2019-12-02 | End: 2019-12-02

## 2019-12-02 RX ORDER — PROCHLORPERAZINE MALEATE 5 MG
10 TABLET ORAL ONCE
Refills: 0 | Status: COMPLETED | OUTPATIENT
Start: 2019-12-02 | End: 2019-12-02

## 2019-12-02 RX ORDER — OXYCODONE AND ACETAMINOPHEN 5; 325 MG/1; MG/1
2 TABLET ORAL EVERY 4 HOURS
Refills: 0 | Status: DISCONTINUED | OUTPATIENT
Start: 2019-12-02 | End: 2019-12-03

## 2019-12-02 RX ADMIN — FENTANYL CITRATE 50 MICROGRAM(S): 50 INJECTION INTRAVENOUS at 19:08

## 2019-12-02 RX ADMIN — OXYCODONE HYDROCHLORIDE 10 MILLIGRAM(S): 5 TABLET ORAL at 19:11

## 2019-12-02 RX ADMIN — OXYCODONE HYDROCHLORIDE 10 MILLIGRAM(S): 5 TABLET ORAL at 19:46

## 2019-12-02 RX ADMIN — FENTANYL CITRATE 50 MICROGRAM(S): 50 INJECTION INTRAVENOUS at 18:37

## 2019-12-02 RX ADMIN — FENTANYL CITRATE 50 MICROGRAM(S): 50 INJECTION INTRAVENOUS at 20:13

## 2019-12-02 RX ADMIN — Medication 975 MILLIGRAM(S): at 09:27

## 2019-12-02 RX ADMIN — FENTANYL CITRATE 50 MICROGRAM(S): 50 INJECTION INTRAVENOUS at 18:15

## 2019-12-02 RX ADMIN — PIPERACILLIN AND TAZOBACTAM 200 GRAM(S): 4; .5 INJECTION, POWDER, LYOPHILIZED, FOR SOLUTION INTRAVENOUS at 12:18

## 2019-12-02 RX ADMIN — MORPHINE SULFATE 2 MILLIGRAM(S): 50 CAPSULE, EXTENDED RELEASE ORAL at 22:00

## 2019-12-02 RX ADMIN — SODIUM CHLORIDE 1000 MILLILITER(S): 9 INJECTION INTRAMUSCULAR; INTRAVENOUS; SUBCUTANEOUS at 11:44

## 2019-12-02 RX ADMIN — SODIUM CHLORIDE 1000 MILLILITER(S): 9 INJECTION INTRAMUSCULAR; INTRAVENOUS; SUBCUTANEOUS at 12:00

## 2019-12-02 RX ADMIN — PANTOPRAZOLE SODIUM 40 MILLIGRAM(S): 20 TABLET, DELAYED RELEASE ORAL at 22:32

## 2019-12-02 RX ADMIN — FENTANYL CITRATE 50 MICROGRAM(S): 50 INJECTION INTRAVENOUS at 20:10

## 2019-12-02 RX ADMIN — OXYCODONE AND ACETAMINOPHEN 2 TABLET(S): 5; 325 TABLET ORAL at 23:38

## 2019-12-02 RX ADMIN — SODIUM CHLORIDE 1000 MILLILITER(S): 9 INJECTION INTRAMUSCULAR; INTRAVENOUS; SUBCUTANEOUS at 09:27

## 2019-12-02 RX ADMIN — SODIUM CHLORIDE 1000 MILLILITER(S): 9 INJECTION INTRAMUSCULAR; INTRAVENOUS; SUBCUTANEOUS at 23:33

## 2019-12-02 RX ADMIN — FENTANYL CITRATE 50 MICROGRAM(S): 50 INJECTION INTRAVENOUS at 18:30

## 2019-12-02 RX ADMIN — MORPHINE SULFATE 2 MILLIGRAM(S): 50 CAPSULE, EXTENDED RELEASE ORAL at 21:46

## 2019-12-02 RX ADMIN — Medication 10 MILLIGRAM(S): at 18:30

## 2019-12-02 RX ADMIN — SODIUM CHLORIDE 1000 MILLILITER(S): 9 INJECTION INTRAMUSCULAR; INTRAVENOUS; SUBCUTANEOUS at 13:19

## 2019-12-02 RX ADMIN — ONDANSETRON 4 MILLIGRAM(S): 8 TABLET, FILM COATED ORAL at 18:15

## 2019-12-02 RX ADMIN — PIPERACILLIN AND TAZOBACTAM 3.38 GRAM(S): 4; .5 INJECTION, POWDER, LYOPHILIZED, FOR SOLUTION INTRAVENOUS at 12:48

## 2019-12-02 NOTE — H&P ADULT - NSHPPHYSICALEXAM_GEN_ALL_CORE
Vital Signs Last 24 Hrs  T(C): 36.9 (02 Dec 2019 10:40), Max: 37.1 (02 Dec 2019 08:05)  T(F): 98.4 (02 Dec 2019 10:40), Max: 98.7 (02 Dec 2019 08:05)  HR: 91 (02 Dec 2019 10:40) (91 - 113)  BP: 107/58 (02 Dec 2019 10:40) (107/58 - 119/65)  BP(mean): --  RR: 19 (02 Dec 2019 10:40) (19 - 19)  SpO2: 99% (02 Dec 2019 10:40) (99% - 100%)      Gen: NAD, AAOx3  CV: RRR, normal S1 and S2  Pulm: CTAB, no wheezing  Abd: soft, obese, non distended, mild RUQ ttp, negative Murpheys, no rebound or guarding  Ext: warm, well perfused, normal ROM

## 2019-12-02 NOTE — ED PROVIDER NOTE - PHYSICAL EXAMINATION
General:  awake, alert, oriented, mild acute distress. Resting in bed.  HEENT: PERRLA EOMI. No trauma/bruising noted to head or face.   CV: TACHY rate and regular rhythm, S1/S2, no murmurs/rubs/gallops noted on exam.   Lungs: Clear to ascultation bilaterally, no wheezes/crackles/rales noted on exam. Equal chest wall excursion noted.   Abdomen: Soft, + left lower quadrant abd tenderness to palpation, +right upper quadrant tenderness to palpation, non distended, no guarding or rebound. No CVA tenderness to palpation appreciated.   MSK: Intact ROM of upper and lower extremities bilaterally. No tenderness to palpation to extremities. Intact ROM of neck. No gross deformities noted to extremities.   Neuro: Awake, A+O x4, moving all extremities spontaneously. CN 2-12 grossly intact. No nystagmus noted. Strength and sensation grossly intact to all extremities. Ambulatory w/o assist. Speech fluent, no slurred speech.   Extremities: No swelling or edema noted to extremities. No calf tenderness to palpation.   Skin: No rash or bruising noted on exam.

## 2019-12-02 NOTE — ED PROVIDER NOTE - OBJECTIVE STATEMENT
24F, pmhx cholelithiasis, presents with chief complaint of     Patient denies:     Med Hx: Cholelithiasis.  Surg Hx:   Meds:   Allergies:   Tobacco, ethanol, or drug use:   LMP:   PMD: 24F, pmhx cholelithiasis, presents with chief complaint of 2 days subjective fevers, body aches, intermittent headache (frontal), intermittent nausea, vomiting x 3 (yesterday), abdominal pain. Abd pain is located to lower abdomen, bilateral, intermittent, cramping in nature. Denies upper abdominal pain. Also reports 2 days RIGHT lower back pain, intermittent. Intermittent dizziness over same period of time.   Took tylenol, nyquil at home w/ minimal relief.   Patient denies: cough, chest pain, shortness of breath, MERIDA, leg swelling, leg cramps, diarrhea, constipation, bloody stools, dysuria, hematuria, vaginal bleeding, neck pain, blurry vision, lightheadedness, congestion, rhinorrhea.   Denies recent travel, recent illness. Children at home with coughing, rhinorrhea, fever over last week period.   Med Hx: Cholelithiasis. Denies other med hx.   Surg Hx: Denies.  Meds: OCPs, none other.   Allergies: Denies.   Tobacco, ethanol, or drug use: Denies   LMP: ~20 days ago per patient   Currently unemployed.   Sexually active w 1 partner, , male.   PMD: DR Robert Latif? ([facility]) 24F, pmhx cholelithiasis, presents with chief complaint of 2 days subjective fevers, body aches, intermittent headache (frontal), intermittent nausea, vomiting x 3 (yesterday), abdominal pain. Abd pain is located to lower abdomen, bilateral, intermittent, cramping in nature. Denies upper abdominal pain. Also reports 2 days RIGHT lower back pain, intermittent. Intermittent dizziness over same period of time. Abd pain and nausea worse after eating.   Took tylenol, nyquil at home w/ minimal relief.   Patient denies: cough, chest pain, chills, shortness of breath, MERIDA, leg swelling, leg cramps, diarrhea, constipation, bloody stools, dysuria, hematuria, vaginal bleeding, neck pain, blurry vision, lightheadedness, congestion, rhinorrhea.   Denies recent travel, recent illness. Children at home with coughing, rhinorrhea, fever over last week period.   Med Hx: Cholelithiasis. Denies other med hx.   Surg Hx: Denies.  Meds: OCPs, none other.   Allergies: Denies.   Tobacco, ethanol, or drug use: Denies   LMP: ~20 days ago per patient   Currently unemployed.   Sexually active w 1 partner, , male.   PMD: DR Robert Latif? ([facility]) 24F, pmhx cholelithiasis, presents with chief complaint of 2 days subjective fevers, body aches, intermittent headache (frontal), intermittent nausea, vomiting x 3 (yesterday), abdominal pain. Abd pain is located to lower abdomen, bilateral, intermittent, cramping in nature. Denies upper abdominal pain. Also reports 2 days RIGHT lower back pain, intermittent. Intermittent dizziness over same period of time. Abd pain and nausea worse after eating.   Took tylenol, nyquil at home w/ minimal relief.   Patient denies: cough, chest pain, chills, shortness of breath, MERIDA, leg swelling, leg cramps, diarrhea, constipation, bloody stools, dysuria, hematuria, vaginal bleeding, neck pain, blurry vision, lightheadedness, congestion, rhinorrhea.   Denies recent travel, recent illness. Children at home with coughing, rhinorrhea, fever over last week period.   Med Hx: Cholelithiasis. Denies other med hx.   Surg Hx: Denies.  Meds: OCPs, none other.   Allergies: Denies.   Tobacco, ethanol, or drug use: Denies   LMP: ~20 days ago per patient   Currently unemployed.   Sexually active w 1 partner, , male.   PMD: DR Robert Latif? ([facility])    no vaginal bleeding or discharge

## 2019-12-02 NOTE — H&P ADULT - ASSESSMENT
23 yo F with symptomatic cholelithiasis, suspicious for acute cholecystitis. Leukocytosis present.    - admit to surgery  - keep NPO, IVF  - will add on for laparoscopic cholecystectomy, possible open  - pain control as needed  - IV abx  - GI/DVT ppx    Plan discussed with Dr. Flores

## 2019-12-02 NOTE — H&P ADULT - NSHPLABSRESULTS_GEN_ALL_CORE
12.1   21.50 )-----------( 289      ( 02 Dec 2019 09:20 )             37.9   12-02    138  |  109<H>  |  11  ----------------------------<  107<H>  3.8   |  24  |  0.75    Ca    8.6      02 Dec 2019 09:20    TPro  7.2  /  Alb  2.9<L>  /  TBili  0.2  /  DBili  x   /  AST  8<L>  /  ALT  16  /  AlkPhos  75  12-02      < from: US Abdomen Limited (12.02.19 @ 13:15) >    EXAM:  US ABDOMEN LIMITED                            PROCEDURE DATE:  12/02/2019          INTERPRETATION:  CLINICAL INFORMATION: Right upper quadrant abdominal   pain and fever with gallstone.    COMPARISON: CT scan abdomen and pelvis 12/2/2019.    TECHNIQUE: Sonography of the right upper quadrant.     FINDINGS:    Liver: Within normal limits.    Bile ducts: The visualized common bile duct is normal in caliber   measuring up to 5 mm.    Gallbladder: There is a 13 mm nonmobile stone at the levelthe   gallbladder neck. There is borderline gallbladder wall thickening   measuring up to 3 mm.     There is a positive sonographic Pritchard sign reported by the   ultrasonographer.    Pancreas: Not adequately visualized.    Right kidney: 12 cm. No hydronephrosis.    Ascites: None.    IVC: Visualized portions are within normal limits.    IMPRESSION:     Cholelithiasis with nonmobile mild dependent stone and borderline wall   thickening.  If there is a clinical suspicion for acute cholecystitis, recommend   further evaluation with nuclear medicine HIDA scan.    < end of copied text >

## 2019-12-02 NOTE — ED ADULT NURSE NOTE - OBJECTIVE STATEMENT
Pt complains of feeling feverish, cold symptoms x 2-3 days; reports flank and pelvic pain with nausea starting last night.

## 2019-12-02 NOTE — PATIENT PROFILE ADULT - BRADEN FRICTION AND SHEAR
Pt have to use [employer] patient [employer] for her medical supplies phone # [phone] and fax  ,they been faxing a list of documents needed to process the order:  Valid RX order include all details with the exact supply list   Sleep study  qualifying oxygen testing within 30 days   Notes from Dr Mccrary prior the  sleep study .  Will be waiting for the fax to come to the [place] location ,pt will be notified      (3) no apparent problem

## 2019-12-02 NOTE — H&P ADULT - HISTORY OF PRESENT ILLNESS
Patient is a 23 yo F with hx of biliary colic (5yrs+) presenting to the ED with complaints of RUQ pain which began this morning. She states that for the past three days she has been running fevers which were no alleviated with tylenol. Yesterday she began to feel nausea but no emesis, and this morning the RUQ pain began. She states the pain is now improved with analgesia. She has been told of having cholelithiasis in the past but was never offered surgery (once because she was pregnant at the time). She last ate yesterday evening, last drank water around 7am.

## 2019-12-02 NOTE — ED PROVIDER NOTE - ATTENDING CONTRIBUTION TO CARE
I, Elsie Abdalla MD, personally saw the patient with resident.  I have personally performed a face to face diagnostic evaluation on this patient.  I have reviewed the resident note and agree with the history, exam, and plan of care, except as noted.  gen young f in bed in distress  abd ttp right abdomen

## 2019-12-02 NOTE — BRIEF OPERATIVE NOTE - OPERATION/FINDINGS
inflamed gallbladder, fatty encasement and adhesions to duodenum taken down  critical view obtained, cystic artery taken with ligasure, cystic duct (short) clipped and ligated  GB removed from fossa, cautery for bleeding from the liver, fibrilar for hemostasis

## 2019-12-02 NOTE — ED PROVIDER NOTE - PROGRESS NOTE DETAILS
TVUS wnl. CT with gallstone near neck. right upper quadrant US with stone, mildly thickened wall, concern for possible josefa. WBC elevated. Surg consulted. Patient resting in bed, no acute distress  Jesus Romo MD, PGY3 Emergency Medicine

## 2019-12-03 ENCOUNTER — TRANSCRIPTION ENCOUNTER (OUTPATIENT)
Age: 24
End: 2019-12-03

## 2019-12-03 VITALS
RESPIRATION RATE: 16 BRPM | DIASTOLIC BLOOD PRESSURE: 80 MMHG | TEMPERATURE: 98 F | OXYGEN SATURATION: 97 % | HEART RATE: 89 BPM | SYSTOLIC BLOOD PRESSURE: 131 MMHG

## 2019-12-03 DIAGNOSIS — K81.9 CHOLECYSTITIS, UNSPECIFIED: ICD-10-CM

## 2019-12-03 LAB
CULTURE RESULTS: SIGNIFICANT CHANGE UP
SPECIMEN SOURCE: SIGNIFICANT CHANGE UP

## 2019-12-03 RX ORDER — SODIUM CHLORIDE 9 MG/ML
3 INJECTION INTRAMUSCULAR; INTRAVENOUS; SUBCUTANEOUS EVERY 8 HOURS
Refills: 0 | Status: DISCONTINUED | OUTPATIENT
Start: 2019-12-03 | End: 2019-12-03

## 2019-12-03 RX ORDER — OXYCODONE AND ACETAMINOPHEN 5; 325 MG/1; MG/1
1 TABLET ORAL
Qty: 20 | Refills: 0
Start: 2019-12-03

## 2019-12-03 RX ADMIN — SODIUM CHLORIDE 100 MILLILITER(S): 9 INJECTION INTRAMUSCULAR; INTRAVENOUS; SUBCUTANEOUS at 03:07

## 2019-12-03 RX ADMIN — OXYCODONE AND ACETAMINOPHEN 2 TABLET(S): 5; 325 TABLET ORAL at 05:29

## 2019-12-03 RX ADMIN — OXYCODONE AND ACETAMINOPHEN 2 TABLET(S): 5; 325 TABLET ORAL at 10:06

## 2019-12-03 RX ADMIN — OXYCODONE AND ACETAMINOPHEN 2 TABLET(S): 5; 325 TABLET ORAL at 06:16

## 2019-12-03 RX ADMIN — OXYCODONE AND ACETAMINOPHEN 2 TABLET(S): 5; 325 TABLET ORAL at 09:05

## 2019-12-03 RX ADMIN — OXYCODONE AND ACETAMINOPHEN 2 TABLET(S): 5; 325 TABLET ORAL at 00:40

## 2019-12-03 RX ADMIN — OXYCODONE AND ACETAMINOPHEN 2 TABLET(S): 5; 325 TABLET ORAL at 13:49

## 2019-12-03 RX ADMIN — ONDANSETRON 4 MILLIGRAM(S): 8 TABLET, FILM COATED ORAL at 07:57

## 2019-12-03 NOTE — PROGRESS NOTE ADULT - PROBLEM SELECTOR PLAN 1
The patient is s/p lap cholecystectomy and doing very well.  All discharge instructions were given to the patient, as well as potential signs of complications.  The patient will follow up in 2 weeks.  [facility]

## 2019-12-03 NOTE — DISCHARGE NOTE PROVIDER - NSDCMRMEDTOKEN_GEN_ALL_CORE_FT
Percocet 5/325 oral tablet: 1-2 tab(s) orally every 4- 6 hours, As Needed -for moderate pain MDD:4gm

## 2019-12-03 NOTE — DISCHARGE NOTE PROVIDER - CARE PROVIDER_API CALL
Edvin Flores)  Surgery  [address], [address]  [place] [address]  Phone: [phone]  Fax: [phone]  Follow Up Time:

## 2019-12-03 NOTE — PROGRESS NOTE ADULT - SUBJECTIVE AND OBJECTIVE BOX
Post Op Day#: 1  Procedure:  Laparoscopic cholecystectomy    The patient is doing well without complaints.    Vital Signs Last 24 Hrs  T(C): 36.9 (03 Dec 2019 05:23), Max: 37.8 (02 Dec 2019 18:05)  T(F): 98.4 (03 Dec 2019 05:23), Max: 100.1 (02 Dec 2019 18:05)  HR: 94 (03 Dec 2019 05:23) (91 - 120)  BP: 121/80 (03 Dec 2019 05:23) (107/58 - 147/75)  BP(mean): --  RR: 18 (03 Dec 2019 05:23) (15 - 19)  SpO2: 100% (03 Dec 2019 05:23) (96% - 100%)    PHYSICAL EXAM:  General: NAD.  HEENT: no JVD, no jaundice.  LUNGS: CTAB.  Heart: S1 S2 RRR  Abd: soft nt/nd   Wounds: clean dry and intact                          12.1   21.50 )-----------( 289      ( 02 Dec 2019 09:20 )             37.9       12-02    138  |  109<H>  |  11  ----------------------------<  107<H>  3.8   |  24  |  0.75    Ca    8.6      02 Dec 2019 09:20    TPro  7.2  /  Alb  2.9<L>  /  TBili  0.2  /  DBili  x   /  AST  8<L>  /  ALT  16  /  AlkPhos  75  12-02

## 2019-12-03 NOTE — DISCHARGE NOTE NURSING/CASE MANAGEMENT/SOCIAL WORK - PATIENT PORTAL LINK FT
You can access the FollowMyHealth Patient Portal offered by [facility] by registering at the following website: [email or web address]. By joining [employer]’s FollowMyHealth portal, you will also be able to view your health information using other applications (apps) compatible with our system.

## 2019-12-03 NOTE — DISCHARGE NOTE PROVIDER - HOSPITAL COURSE
Patient is an 23 yo F that underwent laparoscopic cholecystectomy without any complications. Patient is currently doing very well, pain controlled wit oral medication, and is tolerating regular diet. Patient has had uncomplicated hospital course and is stable for discharge home with follow-up in office in 2 weeks.

## 2019-12-05 DIAGNOSIS — K81.0 ACUTE CHOLECYSTITIS: ICD-10-CM

## 2019-12-05 DIAGNOSIS — K66.0 PERITONEAL ADHESIONS (POSTPROCEDURAL) (POSTINFECTION): ICD-10-CM

## 2019-12-07 LAB
CULTURE RESULTS: SIGNIFICANT CHANGE UP
CULTURE RESULTS: SIGNIFICANT CHANGE UP
SPECIMEN SOURCE: SIGNIFICANT CHANGE UP
SPECIMEN SOURCE: SIGNIFICANT CHANGE UP

## 2019-12-10 ENCOUNTER — EMERGENCY (EMERGENCY)
Facility: HOSPITAL | Age: 24
LOS: 0 days | Discharge: HOME | End: 2019-12-10
Attending: EMERGENCY MEDICINE
Payer: MEDICAID

## 2019-12-10 VITALS
SYSTOLIC BLOOD PRESSURE: 121 MMHG | TEMPERATURE: 98 F | OXYGEN SATURATION: 100 % | DIASTOLIC BLOOD PRESSURE: 78 MMHG | RESPIRATION RATE: 18 BRPM | HEART RATE: 98 BPM

## 2019-12-10 VITALS
TEMPERATURE: 99 F | SYSTOLIC BLOOD PRESSURE: 115 MMHG | DIASTOLIC BLOOD PRESSURE: 72 MMHG | HEART RATE: 81 BPM | RESPIRATION RATE: 18 BRPM | OXYGEN SATURATION: 100 %

## 2019-12-10 DIAGNOSIS — R10.31 RIGHT LOWER QUADRANT PAIN: ICD-10-CM

## 2019-12-10 DIAGNOSIS — Z87.19 PERSONAL HISTORY OF OTHER DISEASES OF THE DIGESTIVE SYSTEM: ICD-10-CM

## 2019-12-10 DIAGNOSIS — Z98.890 OTHER SPECIFIED POSTPROCEDURAL STATES: ICD-10-CM

## 2019-12-10 LAB
ALBUMIN SERPL ELPH-MCNC: 3.4 G/DL — SIGNIFICANT CHANGE UP (ref 3.3–5)
ALP SERPL-CCNC: 102 U/L — SIGNIFICANT CHANGE UP (ref 40–120)
ALT FLD-CCNC: 32 U/L — SIGNIFICANT CHANGE UP (ref 12–78)
ANION GAP SERPL CALC-SCNC: 5 MMOL/L — SIGNIFICANT CHANGE UP (ref 5–17)
APPEARANCE UR: CLEAR — SIGNIFICANT CHANGE UP
APTT BLD: 33.7 SEC — SIGNIFICANT CHANGE UP (ref 27.5–36.3)
AST SERPL-CCNC: 13 U/L — LOW (ref 15–37)
BASOPHILS # BLD AUTO: 0.03 K/UL — SIGNIFICANT CHANGE UP (ref 0–0.2)
BASOPHILS NFR BLD AUTO: 0.2 % — SIGNIFICANT CHANGE UP (ref 0–2)
BILIRUB SERPL-MCNC: 0.2 MG/DL — SIGNIFICANT CHANGE UP (ref 0.2–1.2)
BILIRUB UR-MCNC: NEGATIVE — SIGNIFICANT CHANGE UP
BUN SERPL-MCNC: 11 MG/DL — SIGNIFICANT CHANGE UP (ref 7–23)
CALCIUM SERPL-MCNC: 9.5 MG/DL — SIGNIFICANT CHANGE UP (ref 8.5–10.1)
CHLORIDE SERPL-SCNC: 105 MMOL/L — SIGNIFICANT CHANGE UP (ref 96–108)
CO2 SERPL-SCNC: 27 MMOL/L — SIGNIFICANT CHANGE UP (ref 22–31)
COLOR SPEC: YELLOW — SIGNIFICANT CHANGE UP
CREAT SERPL-MCNC: 0.81 MG/DL — SIGNIFICANT CHANGE UP (ref 0.5–1.3)
DIFF PNL FLD: ABNORMAL
EOSINOPHIL # BLD AUTO: 0.1 K/UL — SIGNIFICANT CHANGE UP (ref 0–0.5)
EOSINOPHIL NFR BLD AUTO: 0.6 % — SIGNIFICANT CHANGE UP (ref 0–6)
GLUCOSE SERPL-MCNC: 95 MG/DL — SIGNIFICANT CHANGE UP (ref 70–99)
GLUCOSE UR QL: NEGATIVE MG/DL — SIGNIFICANT CHANGE UP
HCT VFR BLD CALC: 38.7 % — SIGNIFICANT CHANGE UP (ref 34.5–45)
HGB BLD-MCNC: 12.4 G/DL — SIGNIFICANT CHANGE UP (ref 11.5–15.5)
IMM GRANULOCYTES NFR BLD AUTO: 0.4 % — SIGNIFICANT CHANGE UP (ref 0–1.5)
INR BLD: 1.23 RATIO — HIGH (ref 0.88–1.16)
KETONES UR-MCNC: NEGATIVE — SIGNIFICANT CHANGE UP
LEUKOCYTE ESTERASE UR-ACNC: ABNORMAL
LIDOCAIN IGE QN: 155 U/L — SIGNIFICANT CHANGE UP (ref 73–393)
LYMPHOCYTES # BLD AUTO: 20 % — SIGNIFICANT CHANGE UP (ref 13–44)
LYMPHOCYTES # BLD AUTO: 3.28 K/UL — SIGNIFICANT CHANGE UP (ref 1–3.3)
MCHC RBC-ENTMCNC: 27.9 PG — SIGNIFICANT CHANGE UP (ref 27–34)
MCHC RBC-ENTMCNC: 32 GM/DL — SIGNIFICANT CHANGE UP (ref 32–36)
MCV RBC AUTO: 87.2 FL — SIGNIFICANT CHANGE UP (ref 80–100)
MONOCYTES # BLD AUTO: 0.75 K/UL — SIGNIFICANT CHANGE UP (ref 0–0.9)
MONOCYTES NFR BLD AUTO: 4.6 % — SIGNIFICANT CHANGE UP (ref 2–14)
NEUTROPHILS # BLD AUTO: 12.2 K/UL — HIGH (ref 1.8–7.4)
NEUTROPHILS NFR BLD AUTO: 74.2 % — SIGNIFICANT CHANGE UP (ref 43–77)
NITRITE UR-MCNC: NEGATIVE — SIGNIFICANT CHANGE UP
PH UR: 7 — SIGNIFICANT CHANGE UP (ref 5–8)
PLATELET # BLD AUTO: 500 K/UL — HIGH (ref 150–400)
POTASSIUM SERPL-MCNC: 3.8 MMOL/L — SIGNIFICANT CHANGE UP (ref 3.5–5.3)
POTASSIUM SERPL-SCNC: 3.8 MMOL/L — SIGNIFICANT CHANGE UP (ref 3.5–5.3)
PROT SERPL-MCNC: 8.2 GM/DL — SIGNIFICANT CHANGE UP (ref 6–8.3)
PROT UR-MCNC: NEGATIVE MG/DL — SIGNIFICANT CHANGE UP
PROTHROM AB SERPL-ACNC: 13.7 SEC — HIGH (ref 10–12.9)
RBC # BLD: 4.44 M/UL — SIGNIFICANT CHANGE UP (ref 3.8–5.2)
RBC # FLD: 12.9 % — SIGNIFICANT CHANGE UP (ref 10.3–14.5)
SODIUM SERPL-SCNC: 137 MMOL/L — SIGNIFICANT CHANGE UP (ref 135–145)
SP GR SPEC: 1.01 — SIGNIFICANT CHANGE UP (ref 1.01–1.02)
UROBILINOGEN FLD QL: NEGATIVE MG/DL — SIGNIFICANT CHANGE UP
WBC # BLD: 16.42 K/UL — HIGH (ref 3.8–10.5)
WBC # FLD AUTO: 16.42 K/UL — HIGH (ref 3.8–10.5)

## 2019-12-10 PROCEDURE — 80053 COMPREHEN METABOLIC PANEL: CPT

## 2019-12-10 PROCEDURE — 85610 PROTHROMBIN TIME: CPT

## 2019-12-10 PROCEDURE — 74177 CT ABD & PELVIS W/CONTRAST: CPT | Mod: 26

## 2019-12-10 PROCEDURE — 99284 EMERGENCY DEPT VISIT MOD MDM: CPT | Mod: 25

## 2019-12-10 PROCEDURE — 83690 ASSAY OF LIPASE: CPT

## 2019-12-10 PROCEDURE — 86850 RBC ANTIBODY SCREEN: CPT

## 2019-12-10 PROCEDURE — 36415 COLL VENOUS BLD VENIPUNCTURE: CPT

## 2019-12-10 PROCEDURE — 85025 COMPLETE CBC W/AUTO DIFF WBC: CPT

## 2019-12-10 PROCEDURE — 86901 BLOOD TYPING SEROLOGIC RH(D): CPT

## 2019-12-10 PROCEDURE — 86900 BLOOD TYPING SEROLOGIC ABO: CPT

## 2019-12-10 PROCEDURE — 99285 EMERGENCY DEPT VISIT HI MDM: CPT

## 2019-12-10 PROCEDURE — 81025 URINE PREGNANCY TEST: CPT

## 2019-12-10 PROCEDURE — 85730 THROMBOPLASTIN TIME PARTIAL: CPT

## 2019-12-10 PROCEDURE — 81001 URINALYSIS AUTO W/SCOPE: CPT

## 2019-12-10 PROCEDURE — 74177 CT ABD & PELVIS W/CONTRAST: CPT

## 2019-12-10 NOTE — ED ADULT TRIAGE NOTE - CHIEF COMPLAINT QUOTE
Pt had lap josefa last mon.  Surgical site healing well with dermabond in place.  Pt c/o RLQ pain.  Denies fever.

## 2019-12-10 NOTE — ED ADULT NURSE NOTE - CHPI ED NUR SYMPTOMS NEG
no abdominal distension/no blood in stool/no chills/no hematuria/no nausea/no burning urination/no vomiting/no diarrhea/no dysuria/no fever

## 2019-12-10 NOTE — ED STATDOCS - PATIENT PORTAL LINK FT
You can access the FollowMyHealth Patient Portal offered by [facility] by registering at the following website: [email or web address]. By joining [employer]’s FollowMyHealth portal, you will also be able to view your health information using other applications (apps) compatible with our system.

## 2019-12-10 NOTE — CONSULT NOTE ADULT - SUBJECTIVE AND OBJECTIVE BOX
Patient is a 23 yo F who is one week s/p laparoscopic cholecystectomy for chronic biliary colic. The GB was very inflamed during procedure. Post op patient states she was recovering well and adhering to low fat diet. Yesterday evening while she was putting away laundry in a high shelf she felt sharp R abdominal/subcostal pain which persisted. She came to ED due to worry from recent surgery. States that pain has since improved with analgesia. Denies fever, chills, nausea, emesis, diarrhea, constipation.     PAST MEDICAL & SURGICAL HISTORY:  No pertinent past medical history  Gall stones  Laparoscopic Cholecystectomy     Vital Signs Last 24 Hrs  T(C): 37.1 (10 Dec 2019 16:09), Max: 37.1 (10 Dec 2019 16:09)  T(F): 98.7 (10 Dec 2019 16:09), Max: 98.7 (10 Dec 2019 16:09)  HR: 81 (10 Dec 2019 16:09) (81 - 98)  BP: 115/72 (10 Dec 2019 16:09) (115/72 - 121/78)  BP(mean): 88 (10 Dec 2019 16:09) (88 - 88)  RR: 18 (10 Dec 2019 16:09) (18 - 18)  SpO2: 100% (10 Dec 2019 16:09) (100% - 100%)    Gen: NAD, AAOx3  CV: RRR, normal S1 and S2  Pulm: CTAB, no wheezing   Abd: soft, obese, non distended, no peritoneal signs, mild R sided abdominal pain  Ext: warm, well perfused, normal ROM    < from: CT Abdomen and Pelvis w/ Oral Cont and w/ IV Cont (12.10.19 @ 13:54) >  EXAM:  CT ABDOMEN AND PELVIS OC IC                            PROCEDURE DATE:  12/10/2019          INTERPRETATION:  CLINICAL INFORMATION: Status post cholecystectomy one   week ago with right-sided pain    COMPARISON: 12/2/2019    PROCEDURE:   CT of the Abdomen and Pelvis was performed with intravenous contrast.   Intravenous contrast: 90 ml Omnipaque 350. 10 ml discarded.  Oral contrast: None.  Sagittal and coronal reformats were performed.    FINDINGS:    LOWER CHEST: Within normal limits.    LIVER: Within normal limits.  BILE DUCTS: Normal caliber.  GALLBLADDER: Status post cholecystectomy with fluid and phlegmonous   changes in the gallbladder fossa. Small droplet of air within the   anterior aspect of the gallbladder fossa.  SPLEEN: Within normal limits.  PANCREAS: Within normal limits.  ADRENALS: Within normal limits.  KIDNEYS/URETERS: Within normal limits.    BLADDER: Within normal limits.  REPRODUCTIVE ORGANS:    BOWEL: No bowel obstruction. Appendix is within normal limits.   Subcentimeter right lower quadrant lymph nodes.  PERITONEUM: No ascites.  VESSELS: Within normal limits.  RETROPERITONEUM/LYMPH NODES: No lymphadenopathy.    ABDOMINAL WALL: Within normal limits.  BONES: Within normal limits.    IMPRESSION:     Status post cholecystectomy with fluid and phlegmonous changes as well as   droplets of air within the gallbladder fossa.    No bowel obstruction or gross bowel wall thickening. Normal appendix.        < end of copied text >

## 2019-12-10 NOTE — ED STATDOCS - CARE PROVIDER_API CALL
Edvin Flores)  Surgery  [address], [address]  [place] [address]  Phone: [phone]  Fax: [phone]  Follow Up Time:

## 2019-12-10 NOTE — ED STATDOCS - ATTENDING CONTRIBUTION TO CARE
I, Araceli Farrar MD,  performed the initial face to face bedside interview with this patient regarding history of present illness, review of symptoms and relevant past medical, social and family history.  I completed an independent physical examination.  I was the initial provider who evaluated this patient. I have signed out the follow up of any pending tests (i.e. labs, radiological studies) to the ACP.  I have communicated the patient’s plan of care and disposition with the ACP.  The history, relevant review of systems, past medical and surgical history, medical decision making, and physical examination was documented by the scribe in my presence and I attest to the accuracy of the documentation.

## 2019-12-10 NOTE — CONSULT NOTE ADULT - ASSESSMENT
25 yo F w/ chronic biliary colic s/p laparoscopic cholecystectomy, presenting to ED with R abdominal pain. Usual post op findings on CT, no evidence of abscess, no other intra-abdominal etiology.     - Suspect muscle strain component from heavy lifting.   - presence of leukocytosis likely due to inflamed nature of index surgery - recc 10 day course Augmentin PO 500mg BID  - will follow up with surgery in the office  - no heavy lifting > 10lbs for 4-6 weeks   - no surgical intervention indicated, may be discharged    Seen and discussed with Dr. Flores

## 2019-12-10 NOTE — ED STATDOCS - OBJECTIVE STATEMENT
23 y/o F with a PMHx of gall stones presents to the ED c/o RLQ pain. Pt was d/c from  last week after having a cholecystectomy. States she was putting clothes away yesterday when she suddenly felt onset of RLQ pain. Notes that she took pain medication and lied down with no relief in symptoms. Pain exacerbated by movement. Denies fever, or N/V/D.

## 2019-12-10 NOTE — ED STATDOCS - PROGRESS NOTE DETAILS
23 yo female with no significant PMH and PSH of lap josefa with Dr. Flores on Monday presents with RLQ pain x 1 day. Pt states the felt fine after the surgery and yesterday she noticed a "ball like pain" to the RLQ, worse with movement. States the lap surgical sites are healing well. Denies n/v/d/c, bloody stools, f/c/sweating, urinary complaints. Labs, UA< CT, Surgery consult. -Edvin Ayers PA-C CT unremarkable. Spoke with surgery resident. Will see pt and states Dr. Flores is on his way to the ER also. -Edvin Ayers PA-C Dr. Flores came to see pt. Wants pt to be d/c home and f/u with him in 1 week and a rx of augmentin 500mg BID x 10 days. Pt aware and agrees with plan. Does not want to wait for the 1st dose here. -Edvin Ayers PA-C

## 2019-12-10 NOTE — ED ADULT NURSE NOTE - OBJECTIVE STATEMENT
Pt c/o RLQ pain. Pt was d/c from  last week after having a cholecystectomy. States she was putting clothes away yesterday when she suddenly felt onset of RLQ pain. Notes that she took pain medication and lied down with no relief in symptoms. Pain exacerbated by movement. Denies fever, or N/V/D.

## 2021-02-09 NOTE — PATIENT PROFILE ADULT - NSPRONUTRITIONRISK_GEN_A_NUR
Initial SW/CM Assessment/Plan of Care Note     Baseline Assessment  33 year old admitted 2/8/2021 as Inpatient.  Prior to admission patient was living with Alone and residing at Apartment.  Patient’s Primary Care Provider is No Pcp.       Prior to Admission Status  Functional Status  Ambulation: Independent/Self  Transportation: Independent/Self    Agency/Support  Type of Services Prior to Hospitalization: None  Support Systems: Parent  Home Devices/Equipment: None  Mobility Assist Devices: None  Sensory Support Devices: None      Barriers to Discharge  Identified Barriers to Discharge/Transition Planning:      Progress Note    Adm with chest pain,sob, hx sickle cell disease. PUI, hem/onc to see.    Discharge plan discussed with: pt      Plan  SW/CM - Recommendations for Discharge:TBD       Refer to SW/CM Flowsheet for Goals and objective data.          No indicators present

## 2021-02-23 ENCOUNTER — EMERGENCY (EMERGENCY)
Facility: HOSPITAL | Age: 26
LOS: 0 days | Discharge: HOME | End: 2021-02-23
Attending: EMERGENCY MEDICINE
Payer: MEDICAID

## 2021-02-23 VITALS
OXYGEN SATURATION: 99 % | SYSTOLIC BLOOD PRESSURE: 136 MMHG | RESPIRATION RATE: 17 BRPM | HEART RATE: 109 BPM | WEIGHT: 177.91 LBS | TEMPERATURE: 99 F | DIASTOLIC BLOOD PRESSURE: 100 MMHG | HEIGHT: 62 IN

## 2021-02-23 VITALS — DIASTOLIC BLOOD PRESSURE: 64 MMHG | HEART RATE: 83 BPM | SYSTOLIC BLOOD PRESSURE: 116 MMHG

## 2021-02-23 DIAGNOSIS — S61.011A LACERATION WITHOUT FOREIGN BODY OF RIGHT THUMB WITHOUT DAMAGE TO NAIL, INITIAL ENCOUNTER: ICD-10-CM
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
## 2021-02-23 DIAGNOSIS — S61.111A LACERATION WITHOUT FOREIGN BODY OF RIGHT THUMB WITH DAMAGE TO NAIL, INITIAL ENCOUNTER: ICD-10-CM

## 2021-02-23 DIAGNOSIS — Z90.49 ACQUIRED ABSENCE OF OTHER SPECIFIED PARTS OF DIGESTIVE TRACT: ICD-10-CM

## 2021-02-23 DIAGNOSIS — Z90.49 ACQUIRED ABSENCE OF OTHER SPECIFIED PARTS OF DIGESTIVE TRACT: Chronic | ICD-10-CM

## 2021-02-23 PROCEDURE — 12001 RPR S/N/AX/GEN/TRNK 2.5CM/<: CPT | Mod: XU

## 2021-02-23 PROCEDURE — 99283 EMERGENCY DEPT VISIT LOW MDM: CPT | Mod: 25

## 2021-02-23 PROCEDURE — 99283 EMERGENCY DEPT VISIT LOW MDM: CPT

## 2021-02-23 PROCEDURE — 12001 RPR S/N/AX/GEN/TRNK 2.5CM/<: CPT

## 2021-02-23 PROCEDURE — 11730 AVULSION NAIL PLATE SIMPLE 1: CPT | Mod: F5

## 2021-02-23 RX ORDER — CEPHALEXIN 500 MG
1 CAPSULE ORAL
Qty: 28 | Refills: 0
Start: 2021-02-23 | End: 2021-03-01

## 2021-02-23 RX ORDER — OXYCODONE HYDROCHLORIDE 5 MG/1
1 TABLET ORAL
Qty: 6 | Refills: 0
Start: 2021-02-23

## 2021-02-23 NOTE — ED STATDOCS - SKIN, MLM
+R hand 2cm laceration involving medial aspect of distal thumb involving nail bed and distal pad, NVI, cap refill less than 2 seconds

## 2021-02-23 NOTE — ED STATDOCS - PATIENT PORTAL LINK FT
You can access the FollowMyHealth Patient Portal offered by [facility] by registering at the following website: [email or web address]. By joining [email or web address]’s FollowMyHealth portal, you will also be able to view your health information using other applications (apps) compatible with our system.

## 2021-02-23 NOTE — ED PROCEDURE NOTE - PROCEDURE ADDITIONAL DETAILS
Patient has resistance to local anesthesia, unable to properly anesthetize.  Patient tolerated portion of nail removal, 1 5-0 gut suture placed, dermabond applied over it for better wound closure.  Dressing applied.  wound care and follow up reviewed.

## 2021-02-23 NOTE — ED STATDOCS - OBJECTIVE STATEMENT
24 y/o F with PMHx of s/p cholecystectomy presents ambulatory to the ED c/o +laceration to R thumb incurred approx 20 min PTA. Pt was attempting to open a sprite bottle with a knife when the knife slipped cutting her finger. No fever. Pt R hand dominant. Tetanus UTD. LMP: 2 months ago, negative pregnancy test yesterday. NKDA. PCP: Dr. Lexi Pritchard.

## 2021-02-23 NOTE — ED STATDOCS - PROGRESS NOTE DETAILS
Patient seen and evaluated.  portion of nail removed, laceration repaired, wound care reviewed -Dalton Jones PA-C

## 2021-02-23 NOTE — ED ADULT NURSE NOTE - OBJECTIVE STATEMENT
pt presents to ed ambulatory for evaluation of right thumb laceration PTA while attempting to open a soda bottle with knife and the knife slipped cutting her thumb/nail. pt is right hand dominant, neuro in tact. tetanus is up to date. pt a&ox4, no other complaints. in no distress. bleeding controlled

## 2021-02-23 NOTE — ED STATDOCS - ATTENDING CONTRIBUTION TO CARE
I, Bhupinder Savage MD,  performed the initial face to face bedside interview with this patient regarding history of present illness, review of symptoms and relevant past medical, social and family history.  I completed an independent physical examination.  I was the initial provider who evaluated this patient. I have signed out the follow up of any pending tests (i.e. labs, radiological studies) to the ACP.  I have communicated the patient’s plan of care and disposition with the ACP.  The history, relevant review of systems, past medical and surgical history, medical decision making, and physical examination was documented by the scribe in my presence and I attest to the accuracy of the documentation.

## 2021-04-27 NOTE — PATIENT PROFILE OB - DELIVERY INFORMATION-PLACENTA STATUS, BABY A
Benefits, risks, and possible complications of procedure explained to patient/caregiver who verbalized understanding and gave written consent.
This was an emergent procedure.
intact/delivered spontaneously

## 2021-05-28 NOTE — ED ADULT NURSE NOTE - DISCHARGE DATE/TIME
Patient Notified.    of pathology results    Patient verbalized understanding of information given.      23-Feb-2021 12:09

## 2021-06-17 NOTE — DISCHARGE NOTE NURSING/CASE MANAGEMENT/SOCIAL WORK - NSTOBACCONEVERSMOKERY/N_GEN_A
No as per PCP Dr Garcia. could not contact pt till 5pm instructed the pt to go to [facility] ER and to follow up with PCP Dr Garcia.

## 2021-11-08 ENCOUNTER — EMERGENCY (EMERGENCY)
Facility: HOSPITAL | Age: 26
LOS: 0 days | Discharge: HOME | End: 2021-11-09
Attending: EMERGENCY MEDICINE
Payer: MEDICAID

## 2021-11-08 VITALS — WEIGHT: 175.05 LBS | HEIGHT: 62 IN

## 2021-11-08 DIAGNOSIS — Z90.49 ACQUIRED ABSENCE OF OTHER SPECIFIED PARTS OF DIGESTIVE TRACT: Chronic | ICD-10-CM

## 2021-11-08 DIAGNOSIS — Z20.822 CONTACT WITH AND (SUSPECTED) EXPOSURE TO COVID-19: ICD-10-CM

## 2021-11-08 DIAGNOSIS — N39.0 URINARY TRACT INFECTION, SITE NOT SPECIFIED: ICD-10-CM

## 2021-11-08 DIAGNOSIS — R10.32 LEFT LOWER QUADRANT PAIN: ICD-10-CM

## 2021-11-08 DIAGNOSIS — Z90.49 ACQUIRED ABSENCE OF OTHER SPECIFIED PARTS OF DIGESTIVE TRACT: ICD-10-CM

## 2021-11-08 LAB
ALBUMIN SERPL ELPH-MCNC: 3.4 G/DL — SIGNIFICANT CHANGE UP (ref 3.3–5)
ALP SERPL-CCNC: 65 U/L — SIGNIFICANT CHANGE UP (ref 40–120)
ALT FLD-CCNC: 25 U/L — SIGNIFICANT CHANGE UP (ref 12–78)
ANION GAP SERPL CALC-SCNC: 6 MMOL/L — SIGNIFICANT CHANGE UP (ref 5–17)
APPEARANCE UR: CLEAR — SIGNIFICANT CHANGE UP
APTT BLD: 30.3 SEC — SIGNIFICANT CHANGE UP (ref 27.5–35.5)
AST SERPL-CCNC: 29 U/L — SIGNIFICANT CHANGE UP (ref 15–37)
BASOPHILS # BLD AUTO: 0.03 K/UL — SIGNIFICANT CHANGE UP (ref 0–0.2)
BASOPHILS NFR BLD AUTO: 0.2 % — SIGNIFICANT CHANGE UP (ref 0–2)
BILIRUB SERPL-MCNC: 0.2 MG/DL — SIGNIFICANT CHANGE UP (ref 0.2–1.2)
BILIRUB UR-MCNC: NEGATIVE — SIGNIFICANT CHANGE UP
BUN SERPL-MCNC: 8 MG/DL — SIGNIFICANT CHANGE UP (ref 7–23)
CALCIUM SERPL-MCNC: 9 MG/DL — SIGNIFICANT CHANGE UP (ref 8.5–10.1)
CHLORIDE SERPL-SCNC: 103 MMOL/L — SIGNIFICANT CHANGE UP (ref 96–108)
CO2 SERPL-SCNC: 28 MMOL/L — SIGNIFICANT CHANGE UP (ref 22–31)
COLOR SPEC: YELLOW — SIGNIFICANT CHANGE UP
CREAT SERPL-MCNC: 0.67 MG/DL — SIGNIFICANT CHANGE UP (ref 0.5–1.3)
DIFF PNL FLD: ABNORMAL
EOSINOPHIL # BLD AUTO: 0.2 K/UL — SIGNIFICANT CHANGE UP (ref 0–0.5)
EOSINOPHIL NFR BLD AUTO: 1.6 % — SIGNIFICANT CHANGE UP (ref 0–6)
GLUCOSE SERPL-MCNC: 125 MG/DL — HIGH (ref 70–99)
GLUCOSE UR QL: NEGATIVE MG/DL — SIGNIFICANT CHANGE UP
HCG SERPL-ACNC: <1 MIU/ML — SIGNIFICANT CHANGE UP
HCT VFR BLD CALC: 40.4 % — SIGNIFICANT CHANGE UP (ref 34.5–45)
HGB BLD-MCNC: 12.8 G/DL — SIGNIFICANT CHANGE UP (ref 11.5–15.5)
IMM GRANULOCYTES NFR BLD AUTO: 0.2 % — SIGNIFICANT CHANGE UP (ref 0–1.5)
INR BLD: 1.11 RATIO — SIGNIFICANT CHANGE UP (ref 0.88–1.16)
KETONES UR-MCNC: NEGATIVE — SIGNIFICANT CHANGE UP
LEUKOCYTE ESTERASE UR-ACNC: ABNORMAL
LYMPHOCYTES # BLD AUTO: 35.5 % — SIGNIFICANT CHANGE UP (ref 13–44)
LYMPHOCYTES # BLD AUTO: 4.38 K/UL — HIGH (ref 1–3.3)
MCHC RBC-ENTMCNC: 28 PG — SIGNIFICANT CHANGE UP (ref 27–34)
MCHC RBC-ENTMCNC: 31.7 GM/DL — LOW (ref 32–36)
MCV RBC AUTO: 88.4 FL — SIGNIFICANT CHANGE UP (ref 80–100)
MONOCYTES # BLD AUTO: 0.58 K/UL — SIGNIFICANT CHANGE UP (ref 0–0.9)
MONOCYTES NFR BLD AUTO: 4.7 % — SIGNIFICANT CHANGE UP (ref 2–14)
NEUTROPHILS # BLD AUTO: 7.13 K/UL — SIGNIFICANT CHANGE UP (ref 1.8–7.4)
NEUTROPHILS NFR BLD AUTO: 57.8 % — SIGNIFICANT CHANGE UP (ref 43–77)
NITRITE UR-MCNC: NEGATIVE — SIGNIFICANT CHANGE UP
PH UR: 7 — SIGNIFICANT CHANGE UP (ref 5–8)
PLATELET # BLD AUTO: 340 K/UL — SIGNIFICANT CHANGE UP (ref 150–400)
POTASSIUM SERPL-MCNC: 4.7 MMOL/L — SIGNIFICANT CHANGE UP (ref 3.5–5.3)
POTASSIUM SERPL-SCNC: 4.7 MMOL/L — SIGNIFICANT CHANGE UP (ref 3.5–5.3)
PROT SERPL-MCNC: 7.8 GM/DL — SIGNIFICANT CHANGE UP (ref 6–8.3)
PROT UR-MCNC: 15 MG/DL
PROTHROM AB SERPL-ACNC: 12.9 SEC — SIGNIFICANT CHANGE UP (ref 10.6–13.6)
RBC # BLD: 4.57 M/UL — SIGNIFICANT CHANGE UP (ref 3.8–5.2)
RBC # FLD: 13 % — SIGNIFICANT CHANGE UP (ref 10.3–14.5)
SODIUM SERPL-SCNC: 137 MMOL/L — SIGNIFICANT CHANGE UP (ref 135–145)
SP GR SPEC: 1.01 — SIGNIFICANT CHANGE UP (ref 1.01–1.02)
UROBILINOGEN FLD QL: NEGATIVE MG/DL — SIGNIFICANT CHANGE UP
WBC # BLD: 12.34 K/UL — HIGH (ref 3.8–10.5)
WBC # FLD AUTO: 12.34 K/UL — HIGH (ref 3.8–10.5)

## 2021-11-08 PROCEDURE — 84702 CHORIONIC GONADOTROPIN TEST: CPT

## 2021-11-08 PROCEDURE — U0003: CPT

## 2021-11-08 PROCEDURE — 96365 THER/PROPH/DIAG IV INF INIT: CPT | Mod: XU

## 2021-11-08 PROCEDURE — G1004: CPT

## 2021-11-08 PROCEDURE — 85730 THROMBOPLASTIN TIME PARTIAL: CPT

## 2021-11-08 PROCEDURE — 36415 COLL VENOUS BLD VENIPUNCTURE: CPT

## 2021-11-08 PROCEDURE — 81001 URINALYSIS AUTO W/SCOPE: CPT

## 2021-11-08 PROCEDURE — 76830 TRANSVAGINAL US NON-OB: CPT

## 2021-11-08 PROCEDURE — 81025 URINE PREGNANCY TEST: CPT

## 2021-11-08 PROCEDURE — 86901 BLOOD TYPING SEROLOGIC RH(D): CPT

## 2021-11-08 PROCEDURE — 93975 VASCULAR STUDY: CPT

## 2021-11-08 PROCEDURE — 99284 EMERGENCY DEPT VISIT MOD MDM: CPT | Mod: 25

## 2021-11-08 PROCEDURE — 86850 RBC ANTIBODY SCREEN: CPT

## 2021-11-08 PROCEDURE — 87086 URINE CULTURE/COLONY COUNT: CPT

## 2021-11-08 PROCEDURE — 80053 COMPREHEN METABOLIC PANEL: CPT

## 2021-11-08 PROCEDURE — 74177 CT ABD & PELVIS W/CONTRAST: CPT | Mod: 26,MG

## 2021-11-08 PROCEDURE — 85610 PROTHROMBIN TIME: CPT

## 2021-11-08 PROCEDURE — U0005: CPT

## 2021-11-08 PROCEDURE — 99285 EMERGENCY DEPT VISIT HI MDM: CPT

## 2021-11-08 PROCEDURE — 74177 CT ABD & PELVIS W/CONTRAST: CPT | Mod: MG

## 2021-11-08 PROCEDURE — 85025 COMPLETE CBC W/AUTO DIFF WBC: CPT

## 2021-11-08 PROCEDURE — 86900 BLOOD TYPING SEROLOGIC ABO: CPT

## 2021-11-08 RX ORDER — ACETAMINOPHEN 500 MG
1000 TABLET ORAL ONCE
Refills: 0 | Status: COMPLETED | OUTPATIENT
Start: 2021-11-08 | End: 2021-11-08

## 2021-11-08 RX ORDER — SODIUM CHLORIDE 9 MG/ML
1000 INJECTION INTRAMUSCULAR; INTRAVENOUS; SUBCUTANEOUS ONCE
Refills: 0 | Status: COMPLETED | OUTPATIENT
Start: 2021-11-08 | End: 2021-11-08

## 2021-11-08 RX ADMIN — SODIUM CHLORIDE 1000 MILLILITER(S): 9 INJECTION INTRAMUSCULAR; INTRAVENOUS; SUBCUTANEOUS at 23:03

## 2021-11-08 RX ADMIN — Medication 400 MILLIGRAM(S): at 23:14

## 2021-11-08 RX ADMIN — Medication 1000 MILLIGRAM(S): at 23:44

## 2021-11-08 RX ADMIN — Medication 1000 MILLIGRAM(S): at 23:45

## 2021-11-08 NOTE — ED PROVIDER NOTE - NSFOLLOWUPINSTRUCTIONS_ED_ALL_ED_FT
Infección urinaria en los adultos  Urinary Tract Infection, Adult     Kimberlyn infección urinaria (IU) puede ocurrir en cualquier lugar de las vías urinarias. Las vías urinarias incluyen a los riñones, los uréteres, la vejiga y la uretra. Estos órganos fabrican, almacenan y eliminan la orina del organismo.  El médico puede usar otras palabras para describir la infección. La IU temo afecta los uréteres y a los riñones (pielonefritis). La IU baja afecta la vejiga (cistitis) y la uretra (uretritis).  ¿Cuáles son las causas?  La mayoría de las infecciones de las vías urinarias es causada por la presencia de bacterias en la ce genital, alrededor de la entrada de las vías urinarias (uretra). Estas bacterias proliferan y causan inflamación en las vías urinarias.  ¿Qué incrementa el riesgo?  Es más probable que contraiga esta afección si:  Tiene colocado un catéter urinario (sonda urinaria) permanente.No puede controlar cuándo orinar o defecar (tiene incontinencia).Es tamika y usted:  Utiliza espermicida o diafragma arlette método anticonceptivo.Tiene niveles bajos de estrógenos.Están embarazadas.Tiene ciertos genes que aumentan meléndez riesgo (genética).Es sexualmente activa.Mary antibióticos.Tiene kimberlyn afección que causa que el flujo de orina sea lento, arlette:  Próstata agrandada, si usted es hombre.Obstrucción de la uretra (estenosis).Cálculo renal.Kimberlyn afección nerviosa que afecta el control de la vejiga (vejiga neurógena).No elvira lo suficiente o no orina con frecuencia.Tiene ciertas enfermedades crónicas, arlette:  Diabetes.Un sistema que combate las enfermedades (sistemainmunitario) debilitado.Anemia drepanocítica.Gota.Lesión en la médula montoya.¿Cuáles son los signos o los síntomas?  Los síntomas de esta afección incluyen:  Necesidad inmediata (urgente) de orinar.Micción frecuente o eliminación de pequeñas cantidades de orina con frecuencia.Ardor o dolor al orinar.Presencia de kristian en la orina.Orina con mal olor u olor atípico.Dificultad para orinar.Orina turbia.Secreción vaginal, si es tamika.Dolor en el abdomen o en la parte inferior de la espalda.Es posible que también tenga:  Vómitos o disminución del apetito.Confusión.Irritabilidad o cansancio.Fiebre.Diarrea.El primer síntoma en los adultos mayores puede ser la confusión. En algunos casos, es posible que no tengan síntomas hasta que la infección empeore.  ¿Cómo se diagnostica?  Esta afección se diagnostica en función de geronimo antecedentes médicos y de un examen físico. También pueden hacerle otras pruebas, incluidas las siguientes:  Análisis de orina.Análisis de kristian.Pruebas de infecciones de transmisión sexual (ITS).Si ha tenido más de kimberlyn infección urinaria (IU), se pueden hacer estudios de diagnóstico por imágenes o kimberlyn cistoscopia para determinar la causa de las infecciones.  ¿Cómo se trata?  El tratamiento de esta afección incluye lo siguiente:  Antibióticos.Medicamentos de venta boby para aliviar las molestias.Beber kimberlyn cantidad suficiente agua para mantenerse hidratado.Si tiene infecciones con frecuencia o tiene otras afecciones, arlette un cálculo renal, es posible que deba efren a un médico especialista en las vías urinarias (urólogo).  En casos poco frecuentes, las infecciones urinarias pueden provocar sepsis. La sepsis es kimberlyn afección potencialmente mortal que se produce cuando el cuerpo responde a kimberlyn infección. La sepsis se trata en el hospital con antibióticos, líquidos y otros medicamentos que se administran por vía intravenosa.  Sigue estas instrucciones en tu casa:     Medicamentos     Mary los medicamentos de venta boby y los recetados solamente arlette se lo haya indicado el médico.Si le recetaron un antibiótico, tómelo arlette se lo haya indicado el médico. No deje de usar el antibiótico aunque comience a sentirse mejor.Indicaciones generales     Asegúrese de hacer lo siguiente:  Vaciar la vejiga con frecuencia y en meléndez totalidad. No contener la orina cleve largos períodos.Vaciar la vejiga después de tener sexo.Limpiarse de adelante hacia atrás después de defecar, si es tamika. Use cada trozo de papel higiénico solo kimberlyn vez cuando se limpie.Jeannette suficiente líquido arlette para mantener la orina de color amarillo pálido.Concurrir a todas las visitas de seguimiento arlette se lo haya indicado el médico. [place] es importante.Comunícate con un médico si:  Los síntomas no mejoran después de 1 o 2 días de tratamiento.Los síntomas desaparecen y luego vuelven a aparecer.Solicite ayuda inmediatamente si tiene:  Dolor intenso en la espalda o en la parte inferior del abdomen.Fiebre.Náuseas o vómitos.Resumen  Kimberlyn infección urinaria (IU) es kimberlyn infección en cualquier parte de las vías urinarias, que incluyen los riñones, los uréteres, la vejiga y la uretra.La mayoría de las infecciones de las vías urinarias es causada por la presencia de bacterias en la ce genital, alrededor de la entrada de las vías urinarias (uretra).El tratamiento de esta afección suele incluir antibióticos.Si le recetaron un antibiótico, tómelo arlette se lo haya indicado el médico. No deje de usar el antibiótico aunque comience a sentirse mejor.Concurrir a todas las visitas de seguimiento arlette se lo haya indicado el médico. [place] es importante.Esta información no tiene arlette fin reemplazar el consejo del médico. Asegúrese de hacerle al médico cualquier pregunta que tenga.    Document Released: 09/27/2006 Document Revised: 12/11/2019 Document Reviewed: 12/11/2019  Elsevier Patient Education © 2020 [employer]

## 2021-11-08 NOTE — ED ADULT TRIAGE NOTE - CHIEF COMPLAINT QUOTE
lower abd pain started yesterday. Denies nausea, vomiting, urinary sx, fever/chills. hx ovarian cyst, cholecystectomy

## 2021-11-08 NOTE — ED PROVIDER NOTE - PATIENT PORTAL LINK FT
You can access the FollowMyHealth Patient Portal offered by [facility] by registering at the following website: [email or web address]. By joining [employer]’s FollowMyHealth portal, you will also be able to view your health information using other applications (apps) compatible with our system.

## 2021-11-08 NOTE — ED PROVIDER NOTE - PHYSICAL EXAMINATION
CONSTITUTIONAL: Well appearing, awake, alert, oriented to person, place, time/situation and in no apparent distress.  · ENMT: Airway patent, Nasal mucosa clear. Mouth with normal mucosa. Throat has no vesicles, no oropharyngeal exudates and uvula is midline.  · EYES: Clear bilaterally, pupils equal, round and reactive to light.  · CARDIAC: Normal rate, regular rhythm.  Heart sounds S1, S2.  No murmurs, rubs or gallops.  · RESPIRATORY: Breath sounds clear and equal bilaterally.  · GASTROINTESTINAL: Abdomen soft, TTP of RLQ > suprapubic region, no guarding.  · MUSCULOSKELETAL: Spine appears normal, range of motion is not limited, no muscle or joint tenderness  · NEUROLOGICAL: Alert and oriented, no focal deficits, no motor or sensory deficits.  · SKIN: Skin normal color for race, warm, dry and intact. No evidence of rash

## 2021-11-08 NOTE — ED PROVIDER NOTE - OBJECTIVE STATEMENT
27 y/o F with h/o cholecystectomy 2 years ago p/w waxing and waning right sided abdominal pain that shoots to the LLQ.  She notes her last meal was at 1730 but currently doesn't have an appetite.  Pt offered pain medication but declining at this time.   :  [id number]

## 2021-11-08 NOTE — ED ADULT NURSE NOTE - OBJECTIVE STATEMENT
Patient presents due to right sided lower abdominal pain that started a few hours ago. Pt denies n/v/d. Pt states she has had her gallbladder removed. PT A&Ox4.

## 2021-11-08 NOTE — ED PROVIDER NOTE - NS ED MD EM SELECTION
Occupational Therapy     Referred by: Andrea Salcido [facility]      GOALS                                                                                                                           [id number] Comprehensive

## 2021-11-09 VITALS
DIASTOLIC BLOOD PRESSURE: 58 MMHG | SYSTOLIC BLOOD PRESSURE: 96 MMHG | TEMPERATURE: 98 F | OXYGEN SATURATION: 97 % | HEART RATE: 85 BPM | RESPIRATION RATE: 16 BRPM

## 2021-11-09 LAB — SARS-COV-2 RNA SPEC QL NAA+PROBE: SIGNIFICANT CHANGE UP

## 2021-11-09 PROCEDURE — 76830 TRANSVAGINAL US NON-OB: CPT | Mod: 26

## 2021-11-09 PROCEDURE — 93975 VASCULAR STUDY: CPT | Mod: 26

## 2021-11-09 RX ORDER — NITROFURANTOIN MACROCRYSTAL 50 MG
1 CAPSULE ORAL
Qty: 14 | Refills: 0
Start: 2021-11-09 | End: 2021-11-15

## 2021-11-09 RX ORDER — PHENAZOPYRIDINE HCL 100 MG
1 TABLET ORAL
Qty: 6 | Refills: 0
Start: 2021-11-09 | End: 2021-11-10

## 2021-11-09 RX ORDER — NITROFURANTOIN MACROCRYSTAL 50 MG
100 CAPSULE ORAL ONCE
Refills: 0 | Status: COMPLETED | OUTPATIENT
Start: 2021-11-09 | End: 2021-11-09

## 2021-11-09 RX ADMIN — SODIUM CHLORIDE 1000 MILLILITER(S): 9 INJECTION INTRAMUSCULAR; INTRAVENOUS; SUBCUTANEOUS at 00:00

## 2021-11-09 RX ADMIN — Medication 100 MILLIGRAM(S): at 02:30

## 2021-11-11 LAB
CULTURE RESULTS: SIGNIFICANT CHANGE UP
SPECIMEN SOURCE: SIGNIFICANT CHANGE UP

## 2021-12-06 ENCOUNTER — EMERGENCY (EMERGENCY)
Facility: HOSPITAL | Age: 26
LOS: 0 days | Discharge: HOME | End: 2021-12-06
Attending: EMERGENCY MEDICINE
Payer: MEDICAID

## 2021-12-06 VITALS
TEMPERATURE: 98 F | DIASTOLIC BLOOD PRESSURE: 73 MMHG | OXYGEN SATURATION: 99 % | RESPIRATION RATE: 18 BRPM | HEART RATE: 91 BPM | SYSTOLIC BLOOD PRESSURE: 126 MMHG

## 2021-12-06 VITALS — HEIGHT: 62 IN | WEIGHT: 199.96 LBS

## 2021-12-06 DIAGNOSIS — R11.0 NAUSEA: ICD-10-CM

## 2021-12-06 DIAGNOSIS — R10.13 EPIGASTRIC PAIN: ICD-10-CM

## 2021-12-06 DIAGNOSIS — Z90.49 ACQUIRED ABSENCE OF OTHER SPECIFIED PARTS OF DIGESTIVE TRACT: ICD-10-CM

## 2021-12-06 DIAGNOSIS — Z87.19 PERSONAL HISTORY OF OTHER DISEASES OF THE DIGESTIVE SYSTEM: ICD-10-CM

## 2021-12-06 DIAGNOSIS — G89.29 OTHER CHRONIC PAIN: ICD-10-CM

## 2021-12-06 DIAGNOSIS — Z90.49 ACQUIRED ABSENCE OF OTHER SPECIFIED PARTS OF DIGESTIVE TRACT: Chronic | ICD-10-CM

## 2021-12-06 LAB
APPEARANCE UR: ABNORMAL
BILIRUB UR-MCNC: NEGATIVE — SIGNIFICANT CHANGE UP
COLOR SPEC: YELLOW — SIGNIFICANT CHANGE UP
DIFF PNL FLD: ABNORMAL
GLUCOSE UR QL: NEGATIVE MG/DL — SIGNIFICANT CHANGE UP
KETONES UR-MCNC: NEGATIVE — SIGNIFICANT CHANGE UP
LEUKOCYTE ESTERASE UR-ACNC: ABNORMAL
NITRITE UR-MCNC: NEGATIVE — SIGNIFICANT CHANGE UP
PH UR: 5 — SIGNIFICANT CHANGE UP (ref 5–8)
PROT UR-MCNC: 15 MG/DL
SP GR SPEC: 1.02 — SIGNIFICANT CHANGE UP (ref 1.01–1.02)
UROBILINOGEN FLD QL: NEGATIVE MG/DL — SIGNIFICANT CHANGE UP

## 2021-12-06 PROCEDURE — 99284 EMERGENCY DEPT VISIT MOD MDM: CPT

## 2021-12-06 PROCEDURE — 81025 URINE PREGNANCY TEST: CPT

## 2021-12-06 PROCEDURE — 81001 URINALYSIS AUTO W/SCOPE: CPT

## 2021-12-06 PROCEDURE — 87086 URINE CULTURE/COLONY COUNT: CPT

## 2021-12-06 PROCEDURE — 99283 EMERGENCY DEPT VISIT LOW MDM: CPT

## 2021-12-06 RX ORDER — ACETAMINOPHEN 500 MG
1000 TABLET ORAL ONCE
Refills: 0 | Status: COMPLETED | OUTPATIENT
Start: 2021-12-06 | End: 2021-12-06

## 2021-12-06 RX ORDER — FAMOTIDINE 10 MG/ML
1 INJECTION INTRAVENOUS
Qty: 20 | Refills: 0
Start: 2021-12-06 | End: 2021-12-15

## 2021-12-06 RX ORDER — FAMOTIDINE 10 MG/ML
20 INJECTION INTRAVENOUS ONCE
Refills: 0 | Status: COMPLETED | OUTPATIENT
Start: 2021-12-06 | End: 2021-12-06

## 2021-12-06 RX ADMIN — FAMOTIDINE 20 MILLIGRAM(S): 10 INJECTION INTRAVENOUS at 20:06

## 2021-12-06 RX ADMIN — Medication 1000 MILLIGRAM(S): at 20:06

## 2021-12-06 RX ADMIN — Medication 20 MILLIGRAM(S): at 20:07

## 2021-12-06 RX ADMIN — Medication 30 MILLILITER(S): at 20:07

## 2021-12-06 NOTE — ED STATDOCS - CLINICAL SUMMARY MEDICAL DECISION MAKING FREE TEXT BOX
epigastric abdominal pain with nausea likely from gastritis. abdomen soft non tender. pt state feels similar to pain from 4weeks ago when had unremarkable ct & TVUS. shared decision made to not image currently

## 2021-12-06 NOTE — ED STATDOCS - NS ED ROS FT
Review of Systems:  	•	CONSTITUTIONAL: no fever  	•	SKIN: no rash  	•	RESPIRATORY: no shortness of breath  	•	CARDIAC: no chest pain  	•	GI:  abd pain, nausea, no vomiting, no diarrhea  	•	GENITO-URINARY:  no dysuria  	•	MUSCULOSKELETAL:  no back pain  	•	NEUROLOGIC: no weakness  	•	ALLERGY: no rhinorrhea  	•	PSYSCHIATRIC: appropriate concern about symptoms

## 2021-12-06 NOTE — ED STATDOCS - ATTENDING CONTRIBUTION TO CARE
I, Willi Narayan MD,  performed the initial face to face bedside interview with this patient regarding history of present illness, review of symptoms and relevant past medical, social and family history.  I completed an independent physical examination.  I was the initial provider who evaluated this patient. I have signed out the follow up of any pending tests (i.e. labs, radiological studies) to the ACP.  The ACP will complete the work up, interpret tests, administer medications if necessary and reassess the patient for an appropriate disposition.  If questions arise the ACP is expected to contact me for consultation. In the current work-flow I usually don't get to speak to nor reassess patients for final disposition once I sign the case out to the ACP (Unless otherwise specifically documented by me).

## 2021-12-06 NOTE — ED STATDOCS - PATIENT PORTAL LINK FT
100 You can access the FollowMyHealth Patient Portal offered by [facility] by registering at the following website: [email or web address]. By joining [employer]’s FollowMyHealth portal, you will also be able to view your health information using other applications (apps) compatible with our system.

## 2021-12-06 NOTE — ED STATDOCS - OBJECTIVE STATEMENT
Pertinent HPI/PMH/PSH/FHx/SHx and Review of Systems contained within  HPI:  Patient p/w CC  abdominal pain x 1 days,  acute on chronic for weeks. cramping epigastric region associated with nausea. feels similar to pain from 3weeks ago when had unremarkable ct & TVUS & dx w/ uti  PMH/PSH relevant for: cholecystectomy, ovarian cyst  ROS negative for: fever, Chest pain, SOB, Nausea, diarrhea, dysuria    FamilyHx and SocialHx not otherwise contributory

## 2021-12-07 LAB
CULTURE RESULTS: SIGNIFICANT CHANGE UP
SPECIMEN SOURCE: SIGNIFICANT CHANGE UP

## 2022-08-04 ENCOUNTER — EMERGENCY (EMERGENCY)
Facility: HOSPITAL | Age: 27
LOS: 0 days | Discharge: HOME | End: 2022-08-05
Attending: FAMILY MEDICINE
Payer: MEDICAID

## 2022-08-04 VITALS — WEIGHT: 179.9 LBS | HEIGHT: 62 IN
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
## 2022-08-04 DIAGNOSIS — R05.9 COUGH, UNSPECIFIED: ICD-10-CM

## 2022-08-04 DIAGNOSIS — Z90.49 ACQUIRED ABSENCE OF OTHER SPECIFIED PARTS OF DIGESTIVE TRACT: Chronic | ICD-10-CM

## 2022-08-04 DIAGNOSIS — T59.811A TOXIC EFFECT OF SMOKE, ACCIDENTAL (UNINTENTIONAL), INITIAL ENCOUNTER: ICD-10-CM
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
## 2022-08-04 DIAGNOSIS — R07.89 OTHER CHEST PAIN: ICD-10-CM

## 2022-08-04 DIAGNOSIS — R06.02 SHORTNESS OF BREATH: ICD-10-CM

## 2022-08-04 DIAGNOSIS — Z90.49 ACQUIRED ABSENCE OF OTHER SPECIFIED PARTS OF DIGESTIVE TRACT: ICD-10-CM

## 2022-08-04 DIAGNOSIS — J70.5 RESPIRATORY CONDITIONS DUE TO SMOKE INHALATION: ICD-10-CM

## 2022-08-04 DIAGNOSIS — Z20.822 CONTACT WITH AND (SUSPECTED) EXPOSURE TO COVID-19: ICD-10-CM
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
## 2022-08-04 LAB
ALBUMIN SERPL ELPH-MCNC: 3.3 G/DL — SIGNIFICANT CHANGE UP (ref 3.3–5)
ALP SERPL-CCNC: 61 U/L — SIGNIFICANT CHANGE UP (ref 40–120)
ALT FLD-CCNC: 23 U/L — SIGNIFICANT CHANGE UP (ref 12–78)
ANION GAP SERPL CALC-SCNC: 3 MMOL/L — LOW (ref 5–17)
APTT BLD: 25.8 SEC — LOW (ref 27.5–35.5)
AST SERPL-CCNC: 10 U/L — LOW (ref 15–37)
BILIRUB SERPL-MCNC: 0.2 MG/DL — SIGNIFICANT CHANGE UP (ref 0.2–1.2)
BUN SERPL-MCNC: 11 MG/DL — SIGNIFICANT CHANGE UP (ref 7–23)
CALCIUM SERPL-MCNC: 8.7 MG/DL — SIGNIFICANT CHANGE UP (ref 8.5–10.1)
CHLORIDE SERPL-SCNC: 108 MMOL/L — SIGNIFICANT CHANGE UP (ref 96–108)
CO2 SERPL-SCNC: 27 MMOL/L — SIGNIFICANT CHANGE UP (ref 22–31)
CREAT SERPL-MCNC: 0.68 MG/DL — SIGNIFICANT CHANGE UP (ref 0.5–1.3)
EGFR: 121 ML/MIN/1.73M2 — SIGNIFICANT CHANGE UP
GLUCOSE SERPL-MCNC: 107 MG/DL — HIGH (ref 70–99)
HCG SERPL-ACNC: <1 MIU/ML — SIGNIFICANT CHANGE UP
HCT VFR BLD CALC: 37.1 % — SIGNIFICANT CHANGE UP (ref 34.5–45)
HGB BLD-MCNC: 12.3 G/DL — SIGNIFICANT CHANGE UP (ref 11.5–15.5)
INR BLD: 1.19 RATIO — HIGH (ref 0.88–1.16)
MCHC RBC-ENTMCNC: 29 PG — SIGNIFICANT CHANGE UP (ref 27–34)
MCHC RBC-ENTMCNC: 33.2 GM/DL — SIGNIFICANT CHANGE UP (ref 32–36)
MCV RBC AUTO: 87.5 FL — SIGNIFICANT CHANGE UP (ref 80–100)
PLATELET # BLD AUTO: 337 K/UL — SIGNIFICANT CHANGE UP (ref 150–400)
POTASSIUM SERPL-MCNC: 4 MMOL/L — SIGNIFICANT CHANGE UP (ref 3.5–5.3)
POTASSIUM SERPL-SCNC: 4 MMOL/L — SIGNIFICANT CHANGE UP (ref 3.5–5.3)
PROT SERPL-MCNC: 7.6 GM/DL — SIGNIFICANT CHANGE UP (ref 6–8.3)
PROTHROM AB SERPL-ACNC: 13.8 SEC — HIGH (ref 10.5–13.4)
RBC # BLD: 4.24 M/UL — SIGNIFICANT CHANGE UP (ref 3.8–5.2)
RBC # FLD: 13.6 % — SIGNIFICANT CHANGE UP (ref 10.3–14.5)
SODIUM SERPL-SCNC: 138 MMOL/L — SIGNIFICANT CHANGE UP (ref 135–145)
TROPONIN I, HIGH SENSITIVITY RESULT: <3 NG/L — SIGNIFICANT CHANGE UP
WBC # BLD: 9.61 K/UL — SIGNIFICANT CHANGE UP (ref 3.8–10.5)
WBC # FLD AUTO: 9.61 K/UL — SIGNIFICANT CHANGE UP (ref 3.8–10.5)

## 2022-08-04 PROCEDURE — 85027 COMPLETE CBC AUTOMATED: CPT

## 2022-08-04 PROCEDURE — 71045 X-RAY EXAM CHEST 1 VIEW: CPT | Mod: 26

## 2022-08-04 PROCEDURE — 85379 FIBRIN DEGRADATION QUANT: CPT

## 2022-08-04 PROCEDURE — 85610 PROTHROMBIN TIME: CPT

## 2022-08-04 PROCEDURE — 99284 EMERGENCY DEPT VISIT MOD MDM: CPT

## 2022-08-04 PROCEDURE — 99285 EMERGENCY DEPT VISIT HI MDM: CPT | Mod: 25

## 2022-08-04 PROCEDURE — 83880 ASSAY OF NATRIURETIC PEPTIDE: CPT

## 2022-08-04 PROCEDURE — 36415 COLL VENOUS BLD VENIPUNCTURE: CPT

## 2022-08-04 PROCEDURE — 93010 ELECTROCARDIOGRAM REPORT: CPT

## 2022-08-04 PROCEDURE — 71045 X-RAY EXAM CHEST 1 VIEW: CPT

## 2022-08-04 PROCEDURE — 93005 ELECTROCARDIOGRAM TRACING: CPT

## 2022-08-04 PROCEDURE — 0225U NFCT DS DNA&RNA 21 SARSCOV2: CPT

## 2022-08-04 PROCEDURE — 84484 ASSAY OF TROPONIN QUANT: CPT

## 2022-08-04 PROCEDURE — 84702 CHORIONIC GONADOTROPIN TEST: CPT

## 2022-08-04 PROCEDURE — 80053 COMPREHEN METABOLIC PANEL: CPT

## 2022-08-04 PROCEDURE — 85730 THROMBOPLASTIN TIME PARTIAL: CPT

## 2022-08-04 NOTE — ED PROVIDER NOTE - NSFOLLOWUPCLINICS_GEN_ALL_ED_FT
[facility]  Family Medicine  [address] [place] [address]  Phone: [phone]  Fax:

## 2022-08-04 NOTE — ED PROVIDER NOTE - PATIENT PORTAL LINK FT
You can access the FollowMyHealth Patient Portal offered by [facility] by registering at the following website: [email or web address]. By joining [employer]’s FollowMyHealth portal, you will also be able to view your health information using other applications (apps) compatible with our system.

## 2022-08-04 NOTE — ED PROVIDER NOTE - CLINICAL SUMMARY MEDICAL DECISION MAKING FREE TEXT BOX
Pt with cough for two weeks which began after inhaling barbeque smoke. No fever. initially improved with zyrtec now persistent now causing left chest pain. Worse with lying down. Will do cxr, ekg, ddimer, labs. Pt stable. Probale gerd or allergic.

## 2022-08-04 NOTE — ED ADULT TRIAGE NOTE - CHIEF COMPLAINT QUOTE
chest pain x couple of months worsening today. pt thought it was allergies and took zyrtec with no relief. + SOB.

## 2022-08-04 NOTE — ED PROVIDER NOTE - NSFOLLOWUPINSTRUCTIONS_ED_ALL_ED_FT
Acute Cough    WHAT YOU NEED TO KNOW:    An acute cough can last up to 3 weeks. Common causes of an acute cough include a cold, allergies, or a lung infection.     DISCHARGE INSTRUCTIONS:    Return to the emergency department if:     You have trouble breathing or feel short of breath.      You cough up blood, or you see blood in your mucus.       You faint or feel weak or dizzy.       You have chest pain when you cough or take a deep breath.       You have new wheezing.     Contact your healthcare provider if:     You have a fever.       Your cough lasts longer than 4 weeks.       Your symptoms do not improve with treatment.       You have questions or concerns about your condition or care.     Medicines:     Medicines may be needed to stop the cough, decrease swelling in your airways, or help open your airways. Medicine may also be given to help you cough up mucus. Ask your healthcare provider what over-the-counter medicines you can take. If you have an infection caused by bacteria, you may need antibiotics.       Take your medicine as directed. Contact your healthcare provider if you think your medicine is not helping or if you have side effects. Tell him or her if you are allergic to any medicine. Keep a list of the medicines, vitamins, and herbs you take. Include the amounts, and when and why you take them. Bring the list or the pill bottles to follow-up visits. Carry your medicine list with you in case of an emergency.    Manage your symptoms:     Do not smoke and stay away from others who smoke. Nicotine and other chemicals in cigarettes and cigars can cause lung damage and make your cough worse. Ask your healthcare provider for information if you currently smoke and need help to quit. E-cigarettes or smokeless tobacco still contain nicotine. Talk to your healthcare provider before you use these products.       Drink extra liquids as directed. Liquids will help thin and loosen mucus so you can cough it up. Liquids will also help prevent dehydration. Examples of good liquids to drink include water, fruit juice, and broth. Do not drink liquids that contain caffeine. Caffeine can increase your risk for dehydration. Ask your healthcare provider how much liquid to drink each day.       Rest as directed. Do not do activities that make your cough worse, such as exercise.       Use a humidifier or vaporizer. Use a cool mist humidifier or a vaporizer to increase air moisture in your home. This may make it easier for you to breathe and help decrease your cough.       Eat 2 to 5 mL of honey 2 times each day. Honey can help thin mucus and decrease your cough.       Use cough drops or lozenges. These can help decrease throat irritation and your cough.     Follow up with your healthcare provider as directed: Write down your questions so you remember to ask them during your visits.                  Log Out.      [employer] CareNotes®     :  [place]  	                     GERD (GASTROESOPHAGEAL REFLUX DISEASE) - General Information           GERD (Gastroesophageal Reflux Disease)    WHAT YOU NEED TO KNOW:    What is gastroesophageal reflux disease (GERD)? GERD is reflux that happens more than 2 times a week for a few weeks. Reflux means acid and food in your stomach back up into your esophagus. GERD can cause other health problems over time if it is not treated.  Digestive Tract         What causes GERD? GERD often happens because the lower muscle (sphincter) of the esophagus does not close properly. The sphincter normally opens to let food into the stomach. It then closes to keep food and stomach acid in the stomach. If the sphincter does not close properly, stomach acid and food back up (reflux) into the esophagus. The following may increase your risk for GERD:  •Certain foods such as spicy foods, chocolate, foods that contain caffeine, peppermint, and fried foods      •Hiatal hernia      •Certain medicines such as calcium channel blockers (used to treat high blood pressure), allergy medicines, sedatives, or antidepressants      •Pregnancy, obesity, or scleroderma      •Lying down after a meal      •Drinking alcohol or smoking cigarettes      What are the signs and symptoms of GERD?   •Heartburn (burning pain in your chest)      •Pain after meals that spreads to your neck, jaw, or shoulder      •Pain that gets better when you change positions      •Bitter or acid taste in your mouth      •A dry cough      •Trouble swallowing or pain with swallowing      •Hoarseness or a sore throat      •Burping or hiccups      •Feeling full soon after you start eating      How is GERD diagnosed? Your healthcare provider will ask about your symptoms and when they started. Tell him or her about other medical conditions you have, your eating habits, and your activities. You may also need any of the following:  •The amount of acid in your esophagus and stomach may be checked. A small probe is used to check the amount.      •An endoscopy is a procedure used to look at the inside of your esophagus and stomach. An endoscope is a bendable tube with a light and camera on the end. Your healthcare provider may remove a small sample of tissue and send it to a lab for tests.  Upper Endoscopy           •X-ray pictures may be taken of your stomach and intestines (bowel). You may be given a chalky liquid to drink before the pictures are taken. This liquid helps your stomach and intestines show up better on the x-rays.      •Pressure and function tests of your esophagus can help find problems such as a hiatal hernia.      How is GERD treated?   •Medicines are used to decrease stomach acid. Medicine may also be used to help your lower esophageal sphincter and stomach contract (tighten) more.      •Surgery is done to wrap the upper part of the stomach around the esophageal sphincter. This will strengthen the sphincter and prevent reflux.      How can I manage GERD?   Prevent GERD       •Do not have foods or drinks that may increase heartburn. These include chocolate, peppermint, fried or fatty foods, drinks that contain caffeine, or carbonated drinks (soda). Other foods include spicy foods, onions, tomatoes, and tomato-based foods. Do not have foods or drinks that can irritate your esophagus, such as citrus fruits, juices, and alcohol.      •Do not eat large meals. When you eat a lot of food at one time, your stomach needs more acid to digest it. Eat 6 small meals each day instead of 3 large ones, and eat slowly. Do not eat meals 2 to 3 hours before bedtime.      •Elevate the head of your bed. Place 6-inch blocks under the head of your bed frame. You may also use more than one pillow under your head and shoulders while you sleep.      •Maintain a healthy weight. If you are overweight, weight loss may help relieve symptoms of GERD.      •Do not smoke. Smoking weakens the lower esophageal sphincter and increases the risk of GERD. Ask your healthcare provider for information if you currently smoke and need help to quit. E-cigarettes or smokeless tobacco still contain nicotine. Talk to your healthcare provider before you use these products.      •Do not put pressure on your abdomen. Pressure pushes acid up into your esophagus. Do not wear clothing that is tight around your waist. Do not bend over. Bend at the knees if you need to pick something up.      Call your local emergency number (911 in the [place]) if:   •You have severe chest pain and sudden trouble breathing.          When should I seek immediate care?   •You have trouble breathing after you vomit.      •You have trouble swallowing, or pain with swallowing.      •Your bowel movements are black, bloody, or tarry-looking.      •Your vomit looks like coffee grounds or has blood in it.      When should I call my doctor?   •You feel full and cannot burp or vomit.      •You vomit large amounts, or you vomit often.      •You are losing weight without trying.      •Your symptoms get worse or do not improve with treatment.      •You have questions or concerns about your condition or care.      CARE AGREEMENT:    You have the right to help plan your care. Learn about your health condition and how it may be treated. Discuss treatment options with your healthcare providers to decide what care you want to receive. You always have the right to refuse treatment.        © Copyright [employer] 2022           back to top                          © Copyright [employer] 2022

## 2022-08-04 NOTE — ED PROVIDER NOTE - OBJECTIVE STATEMENT
Pt is a 28 yo hf with hx cholecystectomy who two months ago was barbequing on gas grill and developed cough and sob. Pt states developed cp secondary to coughing on left chest. No diaphoresis or nausea. Pt took zyrtec with relief. Pt is a 26 yo hf with hx cholecystectomy who two months ago was barbequing on gas grill two weeks ago and developed cough and sob. Pt states developed cp secondary to coughing on left chest. No diaphoresis or nausea. Pt took zyrtec with relief initially but now not helping. worse with lying down. Nonsmoker nondrinker no drugs

## 2022-08-04 NOTE — ED PROVIDER NOTE - PROGRESS NOTE DETAILS
GREGGG: Received signout from Dr. Ricardo that patient will take pantoprazole and should get prescription sent to pharmacy for 2 weeks as cough is likely secondary to GERD.  Will have patient f/u with PMD. Fran GOLDSTEIN for ED attending, Dr. He. I am signing this chart on behalf of Serena Colón.

## 2022-08-04 NOTE — ED ADULT NURSE NOTE - OBJECTIVE STATEMENT
27 year old female found sitting on stretcher complaining of mid chest pain when she coughs.  pt states she had a cough on and off for 3 weeks now.  pt was told it was allergies and has taken zyrtec without relief.  pt has not tested for covid or flu.

## 2022-08-05 VITALS
HEART RATE: 80 BPM | RESPIRATION RATE: 16 BRPM | SYSTOLIC BLOOD PRESSURE: 112 MMHG | OXYGEN SATURATION: 100 % | DIASTOLIC BLOOD PRESSURE: 72 MMHG

## 2022-08-05 LAB
ADD ON TEST-SPECIMEN IN LAB: SIGNIFICANT CHANGE UP
ADD ON TEST-SPECIMEN IN LAB: SIGNIFICANT CHANGE UP
TROPONIN I, HIGH SENSITIVITY RESULT: <3 NG/L — SIGNIFICANT CHANGE UP

## 2022-08-05 RX ORDER — PANTOPRAZOLE SODIUM 20 MG/1
40 TABLET, DELAYED RELEASE ORAL
Refills: 0 | Status: DISCONTINUED | OUTPATIENT
Start: 2022-08-05 | End: 2022-08-05

## 2022-08-05 RX ORDER — PANTOPRAZOLE SODIUM 20 MG/1
40 TABLET, DELAYED RELEASE ORAL ONCE
Refills: 0 | Status: COMPLETED | OUTPATIENT
Start: 2022-08-05 | End: 2022-08-05

## 2022-08-05 RX ORDER — PANTOPRAZOLE SODIUM 20 MG/1
1 TABLET, DELAYED RELEASE ORAL
Qty: 14 | Refills: 0
Start: 2022-08-05 | End: 2022-08-18

## 2022-08-05 RX ADMIN — PANTOPRAZOLE SODIUM 40 MILLIGRAM(S): 20 TABLET, DELAYED RELEASE ORAL at 03:34

## 2023-02-26 ENCOUNTER — EMERGENCY (EMERGENCY)
Facility: HOSPITAL | Age: 28
LOS: 0 days | Discharge: HOME | End: 2023-02-26
Attending: EMERGENCY MEDICINE
Payer: MEDICAID

## 2023-02-26 VITALS
TEMPERATURE: 98 F | SYSTOLIC BLOOD PRESSURE: 125 MMHG | OXYGEN SATURATION: 98 % | HEART RATE: 86 BPM | RESPIRATION RATE: 16 BRPM | DIASTOLIC BLOOD PRESSURE: 85 MMHG

## 2023-02-26 VITALS — HEIGHT: 62 IN | WEIGHT: 179.9 LBS

## 2023-02-26 DIAGNOSIS — N39.0 URINARY TRACT INFECTION, SITE NOT SPECIFIED: ICD-10-CM

## 2023-02-26 DIAGNOSIS — Z90.49 ACQUIRED ABSENCE OF OTHER SPECIFIED PARTS OF DIGESTIVE TRACT: ICD-10-CM

## 2023-02-26 DIAGNOSIS — Z90.49 ACQUIRED ABSENCE OF OTHER SPECIFIED PARTS OF DIGESTIVE TRACT: Chronic | ICD-10-CM

## 2023-02-26 DIAGNOSIS — R10.13 EPIGASTRIC PAIN: ICD-10-CM

## 2023-02-26 DIAGNOSIS — K21.9 GASTRO-ESOPHAGEAL REFLUX DISEASE WITHOUT ESOPHAGITIS: ICD-10-CM

## 2023-02-26 LAB
ALBUMIN SERPL ELPH-MCNC: 3.6 G/DL — SIGNIFICANT CHANGE UP (ref 3.3–5)
ALP SERPL-CCNC: 67 U/L — SIGNIFICANT CHANGE UP (ref 40–120)
ALT FLD-CCNC: 29 U/L — SIGNIFICANT CHANGE UP (ref 12–78)
ANION GAP SERPL CALC-SCNC: 2 MMOL/L — LOW (ref 5–17)
APPEARANCE UR: CLEAR — SIGNIFICANT CHANGE UP
AST SERPL-CCNC: 36 U/L — SIGNIFICANT CHANGE UP (ref 15–37)
BACTERIA # UR AUTO: ABNORMAL
BASOPHILS # BLD AUTO: 0.03 K/UL — SIGNIFICANT CHANGE UP (ref 0–0.2)
BASOPHILS NFR BLD AUTO: 0.2 % — SIGNIFICANT CHANGE UP (ref 0–2)
BILIRUB SERPL-MCNC: 0.4 MG/DL — SIGNIFICANT CHANGE UP (ref 0.2–1.2)
BILIRUB UR-MCNC: NEGATIVE — SIGNIFICANT CHANGE UP
BUN SERPL-MCNC: 12 MG/DL — SIGNIFICANT CHANGE UP (ref 7–23)
CALCIUM SERPL-MCNC: 9.1 MG/DL — SIGNIFICANT CHANGE UP (ref 8.5–10.1)
CHLORIDE SERPL-SCNC: 109 MMOL/L — HIGH (ref 96–108)
CO2 SERPL-SCNC: 28 MMOL/L — SIGNIFICANT CHANGE UP (ref 22–31)
COLOR SPEC: YELLOW — SIGNIFICANT CHANGE UP
CREAT SERPL-MCNC: 0.7 MG/DL — SIGNIFICANT CHANGE UP (ref 0.5–1.3)
DIFF PNL FLD: ABNORMAL
EGFR: 120 ML/MIN/1.73M2 — SIGNIFICANT CHANGE UP
EOSINOPHIL # BLD AUTO: 0.11 K/UL — SIGNIFICANT CHANGE UP (ref 0–0.5)
EOSINOPHIL NFR BLD AUTO: 0.9 % — SIGNIFICANT CHANGE UP (ref 0–6)
EPI CELLS # UR: SIGNIFICANT CHANGE UP
GLUCOSE SERPL-MCNC: 110 MG/DL — HIGH (ref 70–99)
GLUCOSE UR QL: NEGATIVE — SIGNIFICANT CHANGE UP
HCT VFR BLD CALC: 41.3 % — SIGNIFICANT CHANGE UP (ref 34.5–45)
HGB BLD-MCNC: 13.3 G/DL — SIGNIFICANT CHANGE UP (ref 11.5–15.5)
IMM GRANULOCYTES NFR BLD AUTO: 0.2 % — SIGNIFICANT CHANGE UP (ref 0–0.9)
KETONES UR-MCNC: NEGATIVE — SIGNIFICANT CHANGE UP
LEUKOCYTE ESTERASE UR-ACNC: ABNORMAL
LIDOCAIN IGE QN: 89 U/L — SIGNIFICANT CHANGE UP (ref 73–393)
LYMPHOCYTES # BLD AUTO: 2.88 K/UL — SIGNIFICANT CHANGE UP (ref 1–3.3)
LYMPHOCYTES # BLD AUTO: 22.3 % — SIGNIFICANT CHANGE UP (ref 13–44)
MCHC RBC-ENTMCNC: 28.7 PG — SIGNIFICANT CHANGE UP (ref 27–34)
MCHC RBC-ENTMCNC: 32.2 GM/DL — SIGNIFICANT CHANGE UP (ref 32–36)
MCV RBC AUTO: 89 FL — SIGNIFICANT CHANGE UP (ref 80–100)
MONOCYTES # BLD AUTO: 0.64 K/UL — SIGNIFICANT CHANGE UP (ref 0–0.9)
MONOCYTES NFR BLD AUTO: 5 % — SIGNIFICANT CHANGE UP (ref 2–14)
NEUTROPHILS # BLD AUTO: 9.23 K/UL — HIGH (ref 1.8–7.4)
NEUTROPHILS NFR BLD AUTO: 71.4 % — SIGNIFICANT CHANGE UP (ref 43–77)
NITRITE UR-MCNC: NEGATIVE — SIGNIFICANT CHANGE UP
PH UR: 5 — SIGNIFICANT CHANGE UP (ref 5–8)
PLATELET # BLD AUTO: 396 K/UL — SIGNIFICANT CHANGE UP (ref 150–400)
POTASSIUM SERPL-MCNC: 4.9 MMOL/L — SIGNIFICANT CHANGE UP (ref 3.5–5.3)
POTASSIUM SERPL-SCNC: 4.9 MMOL/L — SIGNIFICANT CHANGE UP (ref 3.5–5.3)
PROT SERPL-MCNC: 8 GM/DL — SIGNIFICANT CHANGE UP (ref 6–8.3)
PROT UR-MCNC: NEGATIVE — SIGNIFICANT CHANGE UP
RBC # BLD: 4.64 M/UL — SIGNIFICANT CHANGE UP (ref 3.8–5.2)
RBC # FLD: 12.8 % — SIGNIFICANT CHANGE UP (ref 10.3–14.5)
RBC CASTS # UR COMP ASSIST: SIGNIFICANT CHANGE UP /HPF (ref 0–4)
SODIUM SERPL-SCNC: 139 MMOL/L — SIGNIFICANT CHANGE UP (ref 135–145)
SP GR SPEC: 1.02 — SIGNIFICANT CHANGE UP (ref 1.01–1.02)
UROBILINOGEN FLD QL: NEGATIVE — SIGNIFICANT CHANGE UP
WBC # BLD: 12.92 K/UL — HIGH (ref 3.8–10.5)
WBC # FLD AUTO: 12.92 K/UL — HIGH (ref 3.8–10.5)
WBC UR QL: ABNORMAL /HPF (ref 0–5)

## 2023-02-26 PROCEDURE — 81001 URINALYSIS AUTO W/SCOPE: CPT

## 2023-02-26 PROCEDURE — 36415 COLL VENOUS BLD VENIPUNCTURE: CPT

## 2023-02-26 PROCEDURE — 99284 EMERGENCY DEPT VISIT MOD MDM: CPT | Mod: 25

## 2023-02-26 PROCEDURE — 85025 COMPLETE CBC W/AUTO DIFF WBC: CPT

## 2023-02-26 PROCEDURE — 83690 ASSAY OF LIPASE: CPT

## 2023-02-26 PROCEDURE — 96374 THER/PROPH/DIAG INJ IV PUSH: CPT

## 2023-02-26 PROCEDURE — 80053 COMPREHEN METABOLIC PANEL: CPT

## 2023-02-26 PROCEDURE — C9113: CPT

## 2023-02-26 PROCEDURE — 96375 TX/PRO/DX INJ NEW DRUG ADDON: CPT

## 2023-02-26 PROCEDURE — 99284 EMERGENCY DEPT VISIT MOD MDM: CPT

## 2023-02-26 RX ORDER — CEPHALEXIN 500 MG
1 CAPSULE ORAL
Qty: 28 | Refills: 0
Start: 2023-02-26 | End: 2023-03-04

## 2023-02-26 RX ORDER — PANTOPRAZOLE SODIUM 20 MG/1
40 TABLET, DELAYED RELEASE ORAL ONCE
Refills: 0 | Status: COMPLETED | OUTPATIENT
Start: 2023-02-26 | End: 2023-02-26

## 2023-02-26 RX ORDER — SUCRALFATE 1 G
1 TABLET ORAL ONCE
Refills: 0 | Status: COMPLETED | OUTPATIENT
Start: 2023-02-26 | End: 2023-02-26

## 2023-02-26 RX ORDER — OMEPRAZOLE 10 MG/1
1 CAPSULE, DELAYED RELEASE ORAL
Qty: 14 | Refills: 0
Start: 2023-02-26 | End: 2023-03-11

## 2023-02-26 RX ORDER — CEFTRIAXONE 500 MG/1
1000 INJECTION, POWDER, FOR SOLUTION INTRAMUSCULAR; INTRAVENOUS ONCE
Refills: 0 | Status: COMPLETED | OUTPATIENT
Start: 2023-02-26 | End: 2023-02-26

## 2023-02-26 RX ORDER — SUCRALFATE 1 G
1 TABLET ORAL
Qty: 42 | Refills: 0
Start: 2023-02-26 | End: 2023-03-11

## 2023-02-26 RX ADMIN — Medication 1 GRAM(S): at 21:09

## 2023-02-26 RX ADMIN — PANTOPRAZOLE SODIUM 40 MILLIGRAM(S): 20 TABLET, DELAYED RELEASE ORAL at 21:10

## 2023-02-26 RX ADMIN — CEFTRIAXONE 1000 MILLIGRAM(S): 500 INJECTION, POWDER, FOR SOLUTION INTRAMUSCULAR; INTRAVENOUS at 21:49

## 2023-02-26 NOTE — ED STATDOCS - ENMT, MLM
Nasal mucosa clear.  Mouth with normal mucosa  Throat has no vesicles, no oropharyngeal exudates and uvula is midline. Private car

## 2023-02-26 NOTE — ED STATDOCS - CLINICAL SUMMARY MEDICAL DECISION MAKING FREE TEXT BOX
26 y/o female with known GERD, epigastric pain is likely gastritis. Will send off labs to r/o hepatitis, pancreatitis, evidence of any issues with biliary obstruction. 26 y/o female with known GERD, epigastric pain is likely gastritis. Will send off labs to r/o hepatitis, pancreatitis, evidence of any issues with biliary obstruction.              UTI on labs.  Will Rx Keflex, Omeprazole and Carafate.  Pt. aware she must follow with GI at [facility]. Aware of risks of worsening GERD.  Return for any concerns.  Sri Atwood PA-C

## 2023-02-26 NOTE — ED STATDOCS - OBJECTIVE STATEMENT
26 y/o female with PMHx of cholecystectomy 4years ago, GERD only on Tums as needed presents to the ED c/o 1 day of epigastric abdominal pain triggered after having a strong coffee in the morning. Tried milk and magnesium, cinnamon hot tea, which usually work but without relief this time. Denies fever, cough, SOB, congestion, vomiting/diarrhea. LMP 1/18.

## 2023-02-26 NOTE — ED ADULT NURSE NOTE - OBJECTIVE STATEMENT
RUQ abd pain w/nausea and radiation into back. no vomiting or diarrhea. pt states her gall bladder has been removed. denies fevers, CP

## 2023-02-26 NOTE — ED STATDOCS - NSFOLLOWUPCLINICS_GEN_ALL_ED_FT
[facility]  Family Medicine  [address] [place] [address]  Phone: [phone]  Fax:

## 2023-02-26 NOTE — ED STATDOCS - ATTENDING APP SHARED VISIT CONTRIBUTION OF CARE
Attending Contribution to Care: I, Rhona Ma, performed the initial face to face bedside interview with this patient regarding history of present illness, review of symptoms and relevant past medical, social and family history.  I completed an independent physical examination.  I was the initial provider who evaluated this patient. I have signed out the follow up of any pending tests (i.e. labs, radiological studies) to the ACP.  I have communicated the patient’s plan of care and disposition with the ACP.

## 2023-02-26 NOTE — ED STATDOCS - PATIENT PORTAL LINK FT
You can access the FollowMyHealth Patient Portal offered by [facility] by registering at the following website: [email or web address]. By joining [employer]’s FollowMyHealth portal, you will also be able to view your health information using other applications (apps) compatible with our system.

## 2023-02-26 NOTE — ED STATDOCS - NSFOLLOWUPINSTRUCTIONS_ED_ALL_ED_FT
Gastroesophageal Reflux Disease, Adult       Gastroesophageal reflux (SUKUMAR) happens when acid from the stomach flows up into the tube that connects the mouth and the stomach (esophagus). Normally, food travels down the esophagus and stays in the stomach to be digested. However, when a person has SUKUMAR, food and stomach acid sometimes move back up into the esophagus. If this becomes a more serious problem, the person may be diagnosed with a disease called gastroesophageal reflux disease (GERD). GERD occurs when the reflux:  •Happens often.       •Causes frequent or severe symptoms.       •Causes problems such as damage to the esophagus.      When stomach acid comes in contact with the esophagus, the acid may cause inflammation in the esophagus. Over time, GERD may create small holes (ulcers) in the lining of the esophagus.      What are the causes?    This condition is caused by a problem with the muscle between the esophagus and the stomach (lower esophageal sphincter, or LES). Normally, the LES muscle closes after food passes through the esophagus to the stomach. When the LES is weakened or abnormal, it does not close properly, and that allows food and stomach acid to go back up into the esophagus.    The LES can be weakened by certain dietary substances, medicines, and medical conditions, including:  •Tobacco use.      •Pregnancy.      •Having a hiatal hernia.      •Alcohol use.      •Certain foods and beverages, such as coffee, chocolate, onions, and peppermint.        What increases the risk?    You are more likely to develop this condition if you:  •Have an increased body weight.      •Have a connective tissue disorder.      •Take NSAIDs, such as ibuprofen.        What are the signs or symptoms?    Symptoms of this condition include:  •Heartburn.      •Difficult or painful swallowing and the feeling of having a lump in the throat.      •A bitter taste in the mouth.      •Bad breath and having a large amount of saliva.      •Having an upset or bloated stomach and belching.      •Chest pain. Different conditions can cause chest pain. Make sure you see your health care provider if you experience chest pain.      •Shortness of breath or wheezing.      •Ongoing (chronic) cough or a nighttime cough.      •Wearing away of tooth enamel.      •Weight loss.        How is this diagnosed?    This condition may be diagnosed based on a medical history and a physical exam. To determine if you have mild or severe GERD, your health care provider may also monitor how you respond to treatment. You may also have tests, including:  •A test to examine your stomach and esophagus with a small camera (endoscopy).      •A test that measures the acidity level in your esophagus.      •A test that measures how much pressure is on your esophagus.      •A barium swallow or modified barium swallow test to show the shape, size, and functioning of your esophagus.        How is this treated?    Treatment for this condition may vary depending on how severe your symptoms are. Your health care provider may recommend:  •Changes to your diet.      •Medicine.      •Surgery.      The goal of treatment is to help relieve your symptoms and to prevent complications.      Follow these instructions at home:      Eating and drinking    •Follow a diet as recommended by your health care provider. This may involve avoiding foods and drinks such as:  •Coffee and tea, with or without caffeine.      •Drinks that contain alcohol.      •Energy drinks and sports drinks.      •Carbonated drinks or sodas.      •Chocolate and cocoa.      •Peppermint and mint flavorings.      •Garlic and onions.      •Horseradish.      •Spicy and acidic foods, including peppers, chili powder, servin powder, vinegar, hot sauces, and barbecue sauce.      •Citrus fruit juices and citrus fruits, such as oranges, андрей, and limes.      •Tomato-based foods, such as red sauce, chili, salsa, and pizza with red sauce.      •Fried and fatty foods, such as donuts, french fries, potato chips, and high-fat dressings.      •High-fat meats, such as hot dogs and fatty cuts of red and white meats, such as rib eye steak, sausage, ham, and salinas.      •High-fat dairy items, such as whole milk, butter, and cream cheese.        •Eat small, frequent meals instead of large meals.      •Avoid drinking large amounts of liquid with your meals.      •Avoid eating meals during the 2–3 hours before bedtime.      •Avoid lying down right after you eat.      • Do not exercise right after you eat.        Lifestyle      • Do not use any products that contain nicotine or tobacco. These products include cigarettes, chewing tobacco, and vaping devices, such as e-cigarettes. If you need help quitting, ask your health care provider.      •Try to reduce your stress by using methods such as yoga or meditation. If you need help reducing stress, ask your health care provider.      •If you are overweight, reduce your weight to an amount that is healthy for you. Ask your health care provider for guidance about a safe weight loss goal.      General instructions     •Pay attention to any changes in your symptoms.      •Take over-the-counter and prescription medicines only as told by your health care provider. Do not take aspirin, ibuprofen, or other NSAIDs unless your health care provider told you to take these medicines.      •Wear loose-fitting clothing. Do not wear anything tight around your waist that causes pressure on your abdomen.      •Raise (elevate) the head of your bed about 6 inches (15 cm). You can use a wedge to do this.      •Avoid bending over if this makes your symptoms worse.      •Keep all follow-up visits. This is important.        Contact a health care provider if:  •You have:  •New symptoms.      •Unexplained weight loss.      •Difficulty swallowing or it hurts to swallow.      •Wheezing or a persistent cough.      •A hoarse voice.        •Your symptoms do not improve with treatment.        Get help right away if:    •You have sudden pain in your arms, neck, jaw, teeth, or back.      •You suddenly feel sweaty, dizzy, or light-headed.      •You have chest pain or shortness of breath.      •You vomit and the vomit is green, yellow, or black, or it looks like blood or coffee grounds.      •You faint.      •You have stool that is red, bloody, or black.      •You cannot swallow, drink, or eat.      These symptoms may represent a serious problem that is an emergency. Do not wait to see if the symptoms will go away. Get medical help right away. Call your local emergency services (911 in the [place] Do not drive yourself to the hospital.       Summary    •Gastroesophageal reflux happens when acid from the stomach flows up into the esophagus. GERD is a disease in which the reflux happens often, causes frequent or severe symptoms, or causes problems such as damage to the esophagus.      •Treatment for this condition may vary depending on how severe your symptoms are. Your health care provider may recommend diet and lifestyle changes, medicine, or surgery.      •Contact a health care provider if you have new or worsening symptoms.      •Take over-the-counter and prescription medicines only as told by your health care provider. Do not take aspirin, ibuprofen, or other NSAIDs unless your health care provider told you to do so.      •Keep all follow-up visits as told by your health care provider. This is important.      This information is not intended to replace advice given to you by your health care provider. Make sure you discuss any questions you have with your health care provider.      Urinary Tract Infection, Adult  ImageA urinary tract infection (UTI) is an infection of any part of the urinary tract, which includes the kidneys, ureters, bladder, and urethra. These organs make, store, and get rid of urine in the body. UTI can be a bladder infection (cystitis) or kidney infection (pyelonephritis).    What are the causes?  This infection may be caused by fungi, viruses, or bacteria. Bacteria are the most common cause of UTIs. This condition can also be caused by repeated incomplete emptying of the bladder during urination.    What increases the risk?  This condition is more likely to develop if:    You ignore your need to urinate or hold urine for long periods of time.  You do not empty your bladder completely during urination.  You wipe back to front after urinating or having a bowel movement, if you are female.  You are uncircumcised, if you are male.  You are constipated.  You have a urinary catheter that stays in place (indwelling).  You have a weak defense (immune) system.  You have a medical condition that affects your bowels, kidneys, or bladder.  You have diabetes.  You take antibiotic medicines frequently or for long periods of time, and the antibiotics no longer work well against certain types of infections (antibiotic resistance).  You take medicines that irritate your urinary tract.  You are exposed to chemicals that irritate your urinary tract.  You are female.    What are the signs or symptoms?  Symptoms of this condition include:    Fever.  Frequent urination or passing small amounts of urine frequently.  Needing to urinate urgently.  Pain or burning with urination.  Urine that smells bad or unusual.  Cloudy urine.  Pain in the lower abdomen or back.  Trouble urinating.  Blood in the urine.  Vomiting or being less hungry than normal.  Diarrhea or abdominal pain.  Vaginal discharge, if you are female.    How is this diagnosed?  This condition is diagnosed with a medical history and physical exam. You will also need to provide a urine sample to test your urine. Other tests may be done, including:    Blood tests.  Sexually transmitted disease (STD) testing.    If you have had more than one UTI, a cystoscopy or imaging studies may be done to determine the cause of the infections.    How is this treated?  Treatment for this condition often includes a combination of two or more of the following:    Antibiotic medicine.  Other medicines to treat less common causes of UTI.  Over-the-counter medicines to treat pain.  Drinking enough water to stay hydrated.    Follow these instructions at home:  Take over-the-counter and prescription medicines only as told by your health care provider.  If you were prescribed an antibiotic, take it as told by your health care provider. Do not stop taking the antibiotic even if you start to feel better.  Avoid alcohol, caffeine, tea, and carbonated beverages. They can irritate your bladder.  Drink enough fluid to keep your urine clear or pale yellow.  Keep all follow-up visits as told by your health care provider. This is important.  ImageMake sure to:    Empty your bladder often and completely. Do not hold urine for long periods of time.  Empty your bladder before and after sex.  Wipe from front to back after a bowel movement if you are female. Use each tissue one time when you wipe.    Contact a health care provider if:  You have back pain.  You have a fever.  You feel nauseous or vomit.  Your symptoms do not get better after 3 days.  Your symptoms go away and then return.  Get help right away if:  You have severe back pain or lower abdominal pain.  You are vomiting and cannot keep down any medicines or water.  This information is not intended to replace advice given to you by your health care provider. Make sure you discuss any questions you have with your health care provider.

## 2023-02-26 NOTE — ED STATDOCS - PROGRESS NOTE DETAILS
Pt. is a 27 year old female Hx cholecystectomy 4 years ago presents with epigastric pain.  PT. drank strong coffee this morning.  Pt. taking OTC medications (MOM, hot tea), TUMS however no relief.  Pt. has had GERD for awhile but has not PMD and has not seen a GI.  PT. denies F/C/S, N/V. UTI on labs.  Will Rx Keflex, Omeprazole and Carafate.  Pt. aware she must follow with GI at [facility]. Aware of risks of worsening GERD.  Return for any concerns.  Sri Atwood PA-C

## 2023-02-26 NOTE — ED STATDOCS - NS ED ATTENDING STATEMENT MOD
This was a shared visit with the LAURI. I reviewed and verified the documentation and independently performed the documented:

## 2023-02-26 NOTE — ED ADULT TRIAGE NOTE - CHIEF COMPLAINT QUOTE
RUQ abd pain w/nausea and radiation into back. no vomiting or diarrhea. pt states her gall bladder has been removed. denies fevers, otherwise well appearing

## 2023-03-07 NOTE — ED ADULT TRIAGE NOTE - HEIGHT IN FEET
The aortic root was non-selectively engaged and injected.  The landmarks were identified and marked.  (CATH IMPULSE  5F 110CM) Rate = 12 mL/sec. Total volume = 25 mL. 5

## 2023-07-24 NOTE — ED ADULT TRIAGE NOTE - CHIEF COMPLAINT QUOTE
Post-Discharge Transitional Care Follow Up      Keisha Alvarenga   YOB: 1967    Date of Office Visit:  7/24/2023  Date of Hospital Admission: 1/16/23  Date of Hospital Discharge: 1/16/23  Readmission Risk Score (high >=14%. Medium >=10%):No data recorded    Care management risk score Rising risk (score 2-5) and Complex Care (Scores >=6): No Risk Score On File     Non face to face  following discharge, date last encounter closed (first attempt may have been earlier): *No documented post hospital discharge outreach found in the last 14 days     Call initiated 2 business days of discharge: *No response recorded in the last 14 days     Hospital discharge follow-up  -     MT DISCHARGE MEDS RECONCILED W/ CURRENT OUTPATIENT MED LIST  Bipolar I disorder, most recent episode (or current) manic, moderate ([address])  Assessment & Plan:   At goal, continue current medications and continue current treatment plan today he is not displaying any tangential communication, able to hold attention without flights of ideas. States he is currently taking his medications as prescribed. On Seroquel 100 mg nightly as well as 600 mg of lithium twice daily with meals. Denies any homicidal suicidal ideations, denies any saleem associated symptoms such as difficulty sleeping, inappropriate spending. Does have follow-up visit with psych which she is encouraged to keep. Medical Decision Making: low complexity  Return in about 6 months (around 1/24/2024), or if symptoms worsen or fail to improve. Subjective:   HPI  -Was hospitalized x2 now with bipolar 1 disorder. Exacerbation of saleem at that time this was a week or 2 prior. At that time he had tangential speech, refusing to take medications, did not believe he had \"a problem\". Inpatient course: Discharge summary reviewed- see chart. Interval history/Current status: doing well current.   On his medications and being compliant, following up with psych, had severe abd pain since last night with n/v/ h/o gall stones

## 2023-08-21 ENCOUNTER — APPOINTMENT (OUTPATIENT)
Dept: MATERNAL FETAL MEDICINE | Facility: CLINIC | Age: 28
End: 2023-08-21
Payer: MEDICAID

## 2023-08-21 ENCOUNTER — ASOB RESULT (OUTPATIENT)
Age: 28
End: 2023-08-21

## 2023-08-21 PROCEDURE — 99202 OFFICE O/P NEW SF 15 MIN: CPT | Mod: 95

## 2023-08-23 ENCOUNTER — ASOB RESULT (OUTPATIENT)
Age: 28
End: 2023-08-23

## 2023-08-23 ENCOUNTER — APPOINTMENT (OUTPATIENT)
Dept: ANTEPARTUM | Facility: CLINIC | Age: 28
End: 2023-08-23
Payer: MEDICAID

## 2023-08-23 PROCEDURE — 76816 OB US FOLLOW-UP PER FETUS: CPT

## 2023-09-08 DIAGNOSIS — O26.619 LIVER AND BILIARY TRACT DISORDERS IN PREGNANCY, UNSPECIFIED TRIMESTER: ICD-10-CM

## 2023-09-08 DIAGNOSIS — K83.1 LIVER AND BILIARY TRACT DISORDERS IN PREGNANCY, UNSPECIFIED TRIMESTER: ICD-10-CM

## 2023-09-11 ENCOUNTER — APPOINTMENT (OUTPATIENT)
Dept: ANTEPARTUM | Facility: CLINIC | Age: 28
End: 2023-09-11
Payer: MEDICAID

## 2023-09-11 ENCOUNTER — APPOINTMENT (OUTPATIENT)
Dept: MATERNAL FETAL MEDICINE | Facility: CLINIC | Age: 28
End: 2023-09-11
Payer: MEDICAID

## 2023-09-11 VITALS
SYSTOLIC BLOOD PRESSURE: 100 MMHG | DIASTOLIC BLOOD PRESSURE: 50 MMHG | HEART RATE: 98 BPM | OXYGEN SATURATION: 100 % | WEIGHT: 175 LBS

## 2023-09-11 DIAGNOSIS — Z87.59 PERSONAL HISTORY OF OTHER COMPLICATIONS OF PREGNANCY, CHILDBIRTH AND THE PUERPERIUM: ICD-10-CM

## 2023-09-11 DIAGNOSIS — K80.50 CALCULUS OF BILE DUCT W/OUT CHOLANGITIS OR CHOLECYSTITIS W/OUT OBSTRUCTION: ICD-10-CM

## 2023-09-11 DIAGNOSIS — Z83.3 FAMILY HISTORY OF DIABETES MELLITUS: ICD-10-CM

## 2023-09-11 DIAGNOSIS — Z87.19 PERSONAL HISTORY OF OTHER COMPLICATIONS OF PREGNANCY, CHILDBIRTH AND THE PUERPERIUM: ICD-10-CM

## 2023-09-11 DIAGNOSIS — Z3A.18 18 WEEKS GESTATION OF PREGNANCY: ICD-10-CM

## 2023-09-11 PROCEDURE — 99204 OFFICE O/P NEW MOD 45 MIN: CPT | Mod: TH

## 2023-09-11 PROCEDURE — ZZZZZ: CPT

## 2023-09-11 RX ORDER — CHOLECALCIFEROL (VITAMIN D3) 25 MCG
27-0.8-228 TABLET,CHEWABLE ORAL
Refills: 0 | Status: ACTIVE | COMMUNITY
Start: 2023-09-11

## 2023-09-25 ENCOUNTER — ASOB RESULT (OUTPATIENT)
Age: 28
End: 2023-09-25

## 2023-09-25 ENCOUNTER — APPOINTMENT (OUTPATIENT)
Dept: ANTEPARTUM | Facility: CLINIC | Age: 28
End: 2023-09-25
Payer: MEDICAID

## 2023-09-25 PROCEDURE — 76811 OB US DETAILED SNGL FETUS: CPT

## 2023-09-25 PROCEDURE — 76817 TRANSVAGINAL US OBSTETRIC: CPT

## 2023-10-18 NOTE — PATIENT PROFILE OB - WEIGHT PRE-PREGNANCY IN KG
61 Full Thickness Lip Wedge Repair (Flap) Text: Given the location of the defect and the proximity to free margins a full thickness wedge repair was deemed most appropriate.  Using a sterile surgical marker, the appropriate repair was drawn incorporating the defect and placing the expected incisions perpendicular to the vermilion border.  The vermilion border was also meticulously outlined to ensure appropriate reapproximation during the repair.  The area thus outlined was incised through and through with a #15 scalpel blade.  The muscularis and dermis were reaproximated with deep sutures following hemostasis. Care was taken to realign the vermilion border before proceeding with the superficial closure.  Once the vermilion was realigned the superfical and mucosal closure was finished.

## 2023-11-20 ENCOUNTER — ASOB RESULT (OUTPATIENT)
Age: 28
End: 2023-11-20

## 2023-11-20 ENCOUNTER — APPOINTMENT (OUTPATIENT)
Dept: ANTEPARTUM | Facility: CLINIC | Age: 28
End: 2023-11-20
Payer: MEDICAID

## 2023-11-20 PROCEDURE — 76816 OB US FOLLOW-UP PER FETUS: CPT

## 2023-12-18 ENCOUNTER — ASOB RESULT (OUTPATIENT)
Age: 28
End: 2023-12-18

## 2023-12-18 ENCOUNTER — APPOINTMENT (OUTPATIENT)
Dept: ANTEPARTUM | Facility: CLINIC | Age: 28
End: 2023-12-18
Payer: MEDICAID

## 2023-12-18 PROCEDURE — 76816 OB US FOLLOW-UP PER FETUS: CPT

## 2023-12-21 ENCOUNTER — APPOINTMENT (OUTPATIENT)
Dept: MATERNAL FETAL MEDICINE | Facility: CLINIC | Age: 28
End: 2023-12-21

## 2024-01-05 ENCOUNTER — OUTPATIENT (OUTPATIENT)
Dept: INPATIENT UNIT | Facility: HOSPITAL | Age: 29
LOS: 1 days | Discharge: HOME | End: 2024-01-05
Payer: MEDICAID

## 2024-01-05 ENCOUNTER — RESULT REVIEW (OUTPATIENT)
Age: 29
End: 2024-01-05

## 2024-01-05 ENCOUNTER — APPOINTMENT (OUTPATIENT)
Dept: MATERNAL FETAL MEDICINE | Facility: CLINIC | Age: 29
End: 2024-01-05

## 2024-01-05 VITALS
HEART RATE: 93 BPM | OXYGEN SATURATION: 98 % | TEMPERATURE: 98 F | RESPIRATION RATE: 18 BRPM | SYSTOLIC BLOOD PRESSURE: 109 MMHG | DIASTOLIC BLOOD PRESSURE: 59 MMHG

## 2024-01-05 DIAGNOSIS — O26.899 OTHER SPECIFIED PREGNANCY RELATED CONDITIONS, UNSPECIFIED TRIMESTER: ICD-10-CM

## 2024-01-05 DIAGNOSIS — Z90.49 ACQUIRED ABSENCE OF OTHER SPECIFIED PARTS OF DIGESTIVE TRACT: Chronic | ICD-10-CM

## 2024-01-05 PROCEDURE — 59025 FETAL NON-STRESS TEST: CPT

## 2024-01-05 PROCEDURE — 99202 OFFICE O/P NEW SF 15 MIN: CPT | Mod: 25

## 2024-01-05 PROCEDURE — 76819 FETAL BIOPHYS PROFIL W/O NST: CPT | Mod: 26

## 2024-01-05 PROCEDURE — 59025 FETAL NON-STRESS TEST: CPT | Mod: 26

## 2024-01-05 PROCEDURE — 76819 FETAL BIOPHYS PROFIL W/O NST: CPT

## 2024-01-05 PROCEDURE — 99214 OFFICE O/P EST MOD 30 MIN: CPT

## 2024-01-05 NOTE — OB PROVIDER TRIAGE NOTE - NSHPPHYSICALEXAM_GEN_ALL_CORE
Vital Signs Last 24 Hrs  T(C): 36.8 (05 Jan 2024 09:15), Max: 36.8 (05 Jan 2024 09:15)  T(F): 98.2 (05 Jan 2024 09:15), Max: 98.2 (05 Jan 2024 09:15)  HR: 93 (05 Jan 2024 09:15) (93 - 93)  BP: 109/59 (05 Jan 2024 09:15) (109/59 - 109/59)  BP(mean): --  RR: 18 (05 Jan 2024 09:15) (18 - 18)  SpO2: 98% (05 Jan 2024 09:15) (98% - 98%)    Parameters below as of 05 Jan 2024 09:15  Patient On (Oxygen Delivery Method): room air    Gen: NAD  CV: NRRR  Lungs: CTA  Abd: soft, gravid, non-tender  SVE: deferred  EFM: 140bpm, moderate variability, +accels, -decels  [place]: no contractions   BPP: pending Vital Signs Last 24 Hrs  T(C): 36.8 (05 Jan 2024 09:15), Max: 36.8 (05 Jan 2024 09:15)  T(F): 98.2 (05 Jan 2024 09:15), Max: 98.2 (05 Jan 2024 09:15)  HR: 93 (05 Jan 2024 09:15) (93 - 93)  BP: 109/59 (05 Jan 2024 09:15) (109/59 - 109/59)  BP(mean): --  RR: 18 (05 Jan 2024 09:15) (18 - 18)  SpO2: 98% (05 Jan 2024 09:15) (98% - 98%)    Parameters below as of 05 Jan 2024 09:15  Patient On (Oxygen Delivery Method): room air    Gen: NAD  CV: NRRR  Lungs: CTA  Abd: soft, gravid, non-tender  SVE: deferred  EFM: 140bpm, moderate variability, +accels, -decels  [place]: no contractions   BPP: pending

## 2024-01-05 NOTE — OB PROVIDER TRIAGE NOTE - HISTORY OF PRESENT ILLNESS
OB PA Triage Note    27 y/o  @35.1wks (KRISH ) presents for evaluation s/p fall. Pt was putting her kids on the bus this morning, there is a hammock in her house, and when she tried to get out of the hammock she fell on the ground on her butt. Denies abdominal trauma. Denies ctx, LOF, VB. +FM.     PNC: A1  ObHx: 2012- , FT, 9#  2017- , FT, IOL for ICP, 5#5  GynHx: Denies hx of fibroids, ovarian cysts, abnml PAP smears, STDs  MedHx: A1. Denies hx of HTN, asthma, thyroid problems, blood clots/bleeding problems, hx of blood transfusions  Meds: PNV  All: NKDA  PSHx: 2018- laparscopic cholecystectomy   FHx: Denies hx of blood clots/bleeding problems  Social: Denies alcohol/tobacco/drug use in pregnancy  Psych: Denies hx of anxiety/depression  OB PA Triage Note    29 y/o  @35.1wks (KRISH ) presents for evaluation s/p fall. Pt was putting her kids on the bus this morning, there is a hammock in her house, and when she tried to get out of the hammock she fell on the ground on her butt. Denies abdominal trauma. Denies ctx, LOF, VB. +FM.     PNC: A1  ObHx: 2012- , FT, 9#  2017- , FT, IOL for ICP, 5#5  GynHx: Denies hx of fibroids, ovarian cysts, abnml PAP smears, STDs  MedHx: A1. Denies hx of HTN, asthma, thyroid problems, blood clots/bleeding problems, hx of blood transfusions  Meds: PNV  All: NKDA  PSHx: 2018- laparscopic cholecystectomy   FHx: Denies hx of blood clots/bleeding problems  Social: Denies alcohol/tobacco/drug use in pregnancy  Psych: Denies hx of anxiety/depression

## 2024-01-05 NOTE — OB PROVIDER TRIAGE NOTE - NSOBPROVIDERNOTE_OBGYN_ALL_OB_FT
A/P: 29 y/o G 3 P 2002 @35.1 wks presents for evaluation s/p fall on butt, no abdominal trauma.  - EFM: reactive  - BPP: pending   - Next f/u: Monday as scheduled   - Return precautions given  - Discussed with Dr. Carmen MD1    Tanya Santiago PA-C A/P: 27 y/o G 3 P 2002 @35.1 wks presents for evaluation s/p fall on butt, no abdominal trauma.  - EFM: reactive  - BPP: pending   - Next f/u: Monday as scheduled   - Return precautions given  - Discussed with Dr. Carmen MD1    Tanya Santiago PA-C      Addendum 1218:  BPP resulted 8/8, NST reactive, no ctx's  Stable for d/c with routine follow up  Return precautions given, all questions answered

## 2024-01-05 NOTE — OB RN TRIAGE NOTE - FALL HARM RISK - RISK INTERVENTIONS
Communicate Fall Risk and Risk Factors to all staff, patient, and family/Reinforce activity limits and safety measures with patient and family/Visual Cue: Yellow wristband/Bed in lowest position, wheels locked, appropriate side rails in place/Call bell, personal items and telephone in reach/Instruct patient to call for assistance before getting out of bed or chair/Non-slip footwear when patient is out of bed/[place] to call system/Physically safe environment - no spills, clutter or unnecessary equipment/Purposeful Proactive Rounding/Room/bathroom lighting operational, light cord in reach Communicate Fall Risk and Risk Factors to all staff, patient, and family/Reinforce activity limits and safety measures with patient and family/Visual Cue: Yellow wristband/Bed in lowest position, wheels locked, appropriate side rails in place/Call bell, personal items and telephone in reach/Instruct patient to call for assistance before getting out of bed or chair/Non-slip footwear when patient is out of bed/[place] to call system/Physically safe environment - no spills, clutter or unnecessary equipment/Purposeful Proactive Rounding/Room/bathroom lighting operational, light cord in reach

## 2024-01-08 ENCOUNTER — APPOINTMENT (OUTPATIENT)
Dept: ANTEPARTUM | Facility: CLINIC | Age: 29
End: 2024-01-08

## 2024-01-08 DIAGNOSIS — O24.410 GESTATIONAL DIABETES MELLITUS IN PREGNANCY, DIET CONTROLLED: ICD-10-CM

## 2024-01-08 DIAGNOSIS — Z87.59 PERSONAL HISTORY OF OTHER COMPLICATIONS OF PREGNANCY, CHILDBIRTH AND THE PUERPERIUM: ICD-10-CM

## 2024-01-08 DIAGNOSIS — Z04.3 ENCOUNTER FOR EXAMINATION AND OBSERVATION FOLLOWING OTHER ACCIDENT: ICD-10-CM

## 2024-01-08 DIAGNOSIS — Z3A.35 35 WEEKS GESTATION OF PREGNANCY: ICD-10-CM

## 2024-01-10 ENCOUNTER — APPOINTMENT (OUTPATIENT)
Dept: MATERNAL FETAL MEDICINE | Facility: CLINIC | Age: 29
End: 2024-01-10

## 2024-01-15 ENCOUNTER — ASOB RESULT (OUTPATIENT)
Age: 29
End: 2024-01-15

## 2024-01-15 ENCOUNTER — APPOINTMENT (OUTPATIENT)
Dept: ANTEPARTUM | Facility: CLINIC | Age: 29
End: 2024-01-15
Payer: MEDICAID

## 2024-01-15 PROCEDURE — 76818 FETAL BIOPHYS PROFILE W/NST: CPT

## 2024-01-15 PROCEDURE — 76816 OB US FOLLOW-UP PER FETUS: CPT

## 2024-01-15 PROCEDURE — 76820 UMBILICAL ARTERY ECHO: CPT | Mod: 59

## 2024-01-23 ENCOUNTER — APPOINTMENT (OUTPATIENT)
Dept: ANTEPARTUM | Facility: CLINIC | Age: 29
End: 2024-01-23
Payer: MEDICAID

## 2024-01-23 ENCOUNTER — ASOB RESULT (OUTPATIENT)
Age: 29
End: 2024-01-23

## 2024-01-23 PROCEDURE — 76818 FETAL BIOPHYS PROFILE W/NST: CPT

## 2024-01-23 PROCEDURE — 76820 UMBILICAL ARTERY ECHO: CPT

## 2024-01-28 ENCOUNTER — RESULT REVIEW (OUTPATIENT)
Age: 29
End: 2024-01-28

## 2024-01-28 ENCOUNTER — INPATIENT (INPATIENT)
Facility: HOSPITAL | Age: 29
LOS: 2 days | Discharge: HOME | DRG: 541 | End: 2024-01-31
Attending: OBSTETRICS & GYNECOLOGY | Admitting: OBSTETRICS & GYNECOLOGY
Payer: MEDICAID

## 2024-01-28 VITALS
HEART RATE: 99 BPM | RESPIRATION RATE: 18 BRPM | TEMPERATURE: 97 F | OXYGEN SATURATION: 100 % | DIASTOLIC BLOOD PRESSURE: 62 MMHG | SYSTOLIC BLOOD PRESSURE: 115 MMHG

## 2024-01-28 DIAGNOSIS — Z90.49 ACQUIRED ABSENCE OF OTHER SPECIFIED PARTS OF DIGESTIVE TRACT: Chronic | ICD-10-CM

## 2024-01-28 DIAGNOSIS — O26.899 OTHER SPECIFIED PREGNANCY RELATED CONDITIONS, UNSPECIFIED TRIMESTER: ICD-10-CM

## 2024-01-28 LAB
ALBUMIN SERPL ELPH-MCNC: 2.5 G/DL — LOW (ref 3.3–5)
ALP SERPL-CCNC: 156 U/L — HIGH (ref 40–120)
ALT FLD-CCNC: 25 U/L — SIGNIFICANT CHANGE UP (ref 12–78)
ANION GAP SERPL CALC-SCNC: 8 MMOL/L — SIGNIFICANT CHANGE UP (ref 5–17)
AST SERPL-CCNC: 16 U/L — SIGNIFICANT CHANGE UP (ref 15–37)
BASOPHILS # BLD AUTO: 0.01 K/UL — SIGNIFICANT CHANGE UP (ref 0–0.2)
BASOPHILS NFR BLD AUTO: 0.1 % — SIGNIFICANT CHANGE UP (ref 0–2)
BILIRUB SERPL-MCNC: 0.4 MG/DL — SIGNIFICANT CHANGE UP (ref 0.2–1.2)
BUN SERPL-MCNC: 7 MG/DL — SIGNIFICANT CHANGE UP (ref 7–23)
CALCIUM SERPL-MCNC: 9.2 MG/DL — SIGNIFICANT CHANGE UP (ref 8.5–10.1)
CHLORIDE SERPL-SCNC: 109 MMOL/L — HIGH (ref 96–108)
CO2 SERPL-SCNC: 22 MMOL/L — SIGNIFICANT CHANGE UP (ref 22–31)
CREAT SERPL-MCNC: 0.6 MG/DL — SIGNIFICANT CHANGE UP (ref 0.5–1.3)
EGFR: 124 ML/MIN/1.73M2 — SIGNIFICANT CHANGE UP
EOSINOPHIL # BLD AUTO: 0.05 K/UL — SIGNIFICANT CHANGE UP (ref 0–0.5)
EOSINOPHIL NFR BLD AUTO: 0.5 % — SIGNIFICANT CHANGE UP (ref 0–6)
GLUCOSE BLDC GLUCOMTR-MCNC: 67 MG/DL — LOW (ref 70–99)
GLUCOSE BLDC GLUCOMTR-MCNC: 69 MG/DL — LOW (ref 70–99)
GLUCOSE SERPL-MCNC: 94 MG/DL — SIGNIFICANT CHANGE UP (ref 70–99)
HCT VFR BLD CALC: 39.5 % — SIGNIFICANT CHANGE UP (ref 34.5–45)
HGB BLD-MCNC: 13 G/DL — SIGNIFICANT CHANGE UP (ref 11.5–15.5)
IMM GRANULOCYTES NFR BLD AUTO: 0.4 % — SIGNIFICANT CHANGE UP (ref 0–0.9)
LYMPHOCYTES # BLD AUTO: 2.33 K/UL — SIGNIFICANT CHANGE UP (ref 1–3.3)
LYMPHOCYTES # BLD AUTO: 22.6 % — SIGNIFICANT CHANGE UP (ref 13–44)
MCHC RBC-ENTMCNC: 27.8 PG — SIGNIFICANT CHANGE UP (ref 27–34)
MCHC RBC-ENTMCNC: 32.9 GM/DL — SIGNIFICANT CHANGE UP (ref 32–36)
MCV RBC AUTO: 84.4 FL — SIGNIFICANT CHANGE UP (ref 80–100)
MONOCYTES # BLD AUTO: 0.54 K/UL — SIGNIFICANT CHANGE UP (ref 0–0.9)
MONOCYTES NFR BLD AUTO: 5.2 % — SIGNIFICANT CHANGE UP (ref 2–14)
NEUTROPHILS # BLD AUTO: 7.36 K/UL — SIGNIFICANT CHANGE UP (ref 1.8–7.4)
NEUTROPHILS NFR BLD AUTO: 71.2 % — SIGNIFICANT CHANGE UP (ref 43–77)
PLATELET # BLD AUTO: 340 K/UL — SIGNIFICANT CHANGE UP (ref 150–400)
POTASSIUM SERPL-MCNC: 3.7 MMOL/L — SIGNIFICANT CHANGE UP (ref 3.5–5.3)
POTASSIUM SERPL-SCNC: 3.7 MMOL/L — SIGNIFICANT CHANGE UP (ref 3.5–5.3)
PROT SERPL-MCNC: 6.6 GM/DL — SIGNIFICANT CHANGE UP (ref 6–8.3)
RBC # BLD: 4.68 M/UL — SIGNIFICANT CHANGE UP (ref 3.8–5.2)
RBC # FLD: 14.9 % — HIGH (ref 10.3–14.5)
SODIUM SERPL-SCNC: 139 MMOL/L — SIGNIFICANT CHANGE UP (ref 135–145)
WBC # BLD: 10.33 K/UL — SIGNIFICANT CHANGE UP (ref 3.8–10.5)
WBC # FLD AUTO: 10.33 K/UL — SIGNIFICANT CHANGE UP (ref 3.8–10.5)

## 2024-01-28 PROCEDURE — 36415 COLL VENOUS BLD VENIPUNCTURE: CPT

## 2024-01-28 PROCEDURE — 59050 FETAL MONITOR W/REPORT: CPT

## 2024-01-28 PROCEDURE — 86850 RBC ANTIBODY SCREEN: CPT

## 2024-01-28 PROCEDURE — 99214 OFFICE O/P EST MOD 30 MIN: CPT

## 2024-01-28 PROCEDURE — 80053 COMPREHEN METABOLIC PANEL: CPT

## 2024-01-28 PROCEDURE — 94760 N-INVAS EAR/PLS OXIMETRY 1: CPT

## 2024-01-28 PROCEDURE — 88302 TISSUE EXAM BY PATHOLOGIST: CPT

## 2024-01-28 PROCEDURE — 85014 HEMATOCRIT: CPT

## 2024-01-28 PROCEDURE — 85018 HEMOGLOBIN: CPT

## 2024-01-28 PROCEDURE — 86780 TREPONEMA PALLIDUM: CPT

## 2024-01-28 PROCEDURE — 82962 GLUCOSE BLOOD TEST: CPT

## 2024-01-28 PROCEDURE — 86900 BLOOD TYPING SEROLOGIC ABO: CPT

## 2024-01-28 PROCEDURE — 86901 BLOOD TYPING SEROLOGIC RH(D): CPT

## 2024-01-28 PROCEDURE — 85025 COMPLETE CBC W/AUTO DIFF WBC: CPT

## 2024-01-28 PROCEDURE — C1889: CPT

## 2024-01-28 RX ORDER — OXYTOCIN 10 UNIT/ML
333.33 VIAL (ML) INJECTION
Qty: 20 | Refills: 0 | Status: DISCONTINUED | OUTPATIENT
Start: 2024-01-28 | End: 2024-01-31

## 2024-01-28 RX ORDER — SODIUM CHLORIDE 9 MG/ML
1000 INJECTION, SOLUTION INTRAVENOUS
Refills: 0 | Status: DISCONTINUED | OUTPATIENT
Start: 2024-01-28 | End: 2024-01-29

## 2024-01-28 RX ORDER — OXYTOCIN 10 UNIT/ML
VIAL (ML) INJECTION
Qty: 30 | Refills: 0 | Status: DISCONTINUED | OUTPATIENT
Start: 2024-01-28 | End: 2024-01-29

## 2024-01-28 RX ORDER — SODIUM CHLORIDE 9 MG/ML
1000 INJECTION, SOLUTION INTRAVENOUS
Refills: 0 | Status: DISCONTINUED | OUTPATIENT
Start: 2024-01-28 | End: 2024-01-28

## 2024-01-28 RX ORDER — CITRIC ACID/SODIUM CITRATE 300-500 MG
15 SOLUTION, ORAL ORAL EVERY 6 HOURS
Refills: 0 | Status: DISCONTINUED | OUTPATIENT
Start: 2024-01-28 | End: 2024-01-29

## 2024-01-28 RX ORDER — METFORMIN HYDROCHLORIDE 850 MG/1
1 TABLET ORAL
Refills: 0 | DISCHARGE

## 2024-01-28 RX ORDER — CHLORHEXIDINE GLUCONATE 213 G/1000ML
1 SOLUTION TOPICAL DAILY
Refills: 0 | Status: DISCONTINUED | OUTPATIENT
Start: 2024-01-28 | End: 2024-01-29

## 2024-01-28 RX ADMIN — SODIUM CHLORIDE 125 MILLILITER(S): 9 INJECTION, SOLUTION INTRAVENOUS at 13:29

## 2024-01-28 RX ADMIN — Medication 2 MILLIUNIT(S)/MIN: at 16:48

## 2024-01-28 NOTE — OB PROVIDER H&P - HISTORY OF PRESENT ILLNESS
28y  at 38 weeks 3days GA by LMP who presents to L&D for pelvic pain x3days r/o labor. Patient denies vaginal bleeding, and leakage of fluid. She endorses good fetal movement. Patient states she has nausea throughout the whole pregnancy. Denies fevers, chills, vomiting, chest pain, SOB, dizziness and headache. No other complaints at this time.     KRSIH: 24  LMP: 23    Prenatal course uncomplicated.      POB:  , cholestasis of pregnancy w/ induction @37wks for    PGYN: -fibroids, +ovarian cysts, denies STD hx, denies abnormal PAPs   PMH: Gestational diabetes on metformin 500mg qhs  PSH: Cholecystectomy  SH: Denies EtOH, tobacco and illicit drug use during this pregnancy; feels safe at home   Meds: PNVs, Metformin 500mg qhs  Allergies: NKDA       28y  at 38 weeks 3days GA by LMP who presents to L&D for pelvic pain x3days r/o labor. Patient denies vaginal bleeding, and leakage of fluid. She endorses good fetal movement. Patient states she has nausea throughout the whole pregnancy. Denies fevers, chills, vomiting, chest pain, SOB, dizziness and headache. No other complaints at this time.     KRISH: 24  LMP: 23    Prenatal course uncomplicated.      POB:  , cholestasis of pregnancy w/ induction @37wks for    PGYN: -fibroids, +ovarian cysts, denies STD hx, denies abnormal PAPs   PMH: Gestational diabetes on metformin 500mg qhs  PSH: Cholecystectomy  SH: Denies EtOH, tobacco and illicit drug use during this pregnancy; feels safe at home   Meds: PNVs, Metformin 500mg qhs  Allergies: NKDA       28y  at 38 weeks 3days GA by LMP who presents to L&D for pelvic pain x3days r/o labor. Patient denies vaginal bleeding, and leakage of fluid. She endorses good fetal movement. Patient states she has nausea throughout the whole pregnancy. Denies fevers, chills, vomiting, chest pain, SOB, dizziness and headache. No other complaints at this time.     KRISH: 24  LMP: 23    Prenatal course complicated by gestational diabetes controlled with metformin 500mg qhs. Pt has h/o cholestasis in prior pregnancy but denies any rash/itching during this pregnancy.      POB:  , cholestasis of pregnancy w/ induction @37wks for    PGYN: -fibroids, +ovarian cysts, denies STD hx, denies abnormal PAPs   PMH: Gestational diabetes on metformin 500mg qhs  PSH: Cholecystectomy  SH: Denies EtOH, tobacco and illicit drug use during this pregnancy; feels safe at home   Meds: PNVs, Metformin 500mg qhs  Allergies: NKDA       28y  at 38 weeks 3days GA by LMP who presents to L&D for pelvic pain x3days, with contractions worsening in the morning here to r/o labor. Patient denies vaginal bleeding, and leakage of fluid. She endorses good fetal movement. Patient states she has nausea throughout the whole pregnancy. Denies fevers, chills, vomiting, chest pain, SOB, dizziness and headache. No other complaints at this time.     KRISH: 24  LMP: 23    Prenatal course complicated by GDMA2 controlled with metformin 500mg qhs. Pt has h/o cholestasis in prior pregnancy but denies any rash/itching during this pregnancy.      POB:  , cholestasis of pregnancy w/ induction @37wks for    PGYN: -fibroids, +ovarian cysts, denies STD hx, denies abnormal PAPs   PMH: Gestational diabetes on metformin 500mg qhs  PSH: Cholecystectomy  SH: Denies EtOH, tobacco and illicit drug use during this pregnancy; feels safe at home   Meds: PNVs, Metformin 500mg qhs  Allergies: NKDA

## 2024-01-28 NOTE — OB PROVIDER H&P - ASSESSMENT
28y  at 38 weeks 3days GA by LMP who presents to L&D for pelvic pain x3days r/o labor    A/P:   -Admit to L&D  -Consent  -Admission labs  -NPO, except ice chips   -IV fluids  -diabetes orders  -Labor: Intact. Latent labor. Reyna every 2 mins.   -Fetus: Cat I tracing. Continuous toco and fetal monitoring.   -GBS: Negative, no GBS ppx required   -Analgesia: interested in Epidural    Discussed with Dr. Baum 28y  at 38 weeks 3days GA admitted in labor  GDMA2, on Metformin 500mg QHS    A/P:   -Admit to L&D  -Consent  -Admission labs  -NPO, except ice chips   -IV fluids  - FS every 4 hours during latent labor, every 2h in active labor  -Labor: Intact. Latent labor. Reyna every 2 mins.   -Fetus: Cat I tracing. Continuous toco and fetal monitoring.   -GBS: Negative, no GBS ppx required   -Analgesia: interested in Epidural    Discussed with Dr. Baum

## 2024-01-28 NOTE — OB RN PATIENT PROFILE - SURGICAL SITE INCISION
Patient discharging home. Medications and follow up appointments reviewed with patient. Patient verbalized understanding. IV and tele removed. Patient is stable for discharge at this time.
no

## 2024-01-28 NOTE — OB RN PATIENT PROFILE - FALL HARM RISK - RISK INTERVENTIONS

## 2024-01-28 NOTE — OB RN TRIAGE NOTE - FALL HARM RISK - RISK INTERVENTIONS

## 2024-01-28 NOTE — OB PROVIDER H&P - NSHPPHYSICALEXAM_GEN_ALL_CORE
T(C): 36.1 (01-28-24 @ 11:57), Max: 36.1 (01-28-24 @ 11:57)  HR: 99 (01-28-24 @ 11:57) (99 - 99)  BP: 115/62 (01-28-24 @ 11:57) (115/62 - 115/62)  RR: 18 (01-28-24 @ 11:57) (18 - 18)  SpO2: 100% (01-28-24 @ 11:57) (100% - 100%)    Gen: NAD, well-appearing, AAOx3   Abd: Soft, gravid  Ext: non-tender, non-edematous  SVE: 3cm, 60%, -3    Bedside sono: vertex, anterior placenta  FHT: 120  [place]: reactive cat 1 tracing T(C): 36.1 (01-28-24 @ 11:57), Max: 36.1 (01-28-24 @ 11:57)  HR: 99 (01-28-24 @ 11:57) (99 - 99)  BP: 115/62 (01-28-24 @ 11:57) (115/62 - 115/62)  RR: 18 (01-28-24 @ 11:57) (18 - 18)  SpO2: 100% (01-28-24 @ 11:57) (100% - 100%)    Gen: NAD, well-appearing, AAOx3   Abd: Soft, gravid  Ext: non-tender, non-edematous  SVE: 3cm, 60%, -3    Bedside sono: vertex, anterior placenta  FHT: 120 bpm baseline, moderate accelerations, reactive Cat 1 tracing  [place]: variable, q2min T(C): 36.1 (01-28-24 @ 11:57), Max: 36.1 (01-28-24 @ 11:57)  HR: 99 (01-28-24 @ 11:57) (99 - 99)  BP: 115/62 (01-28-24 @ 11:57) (115/62 - 115/62)  RR: 18 (01-28-24 @ 11:57) (18 - 18)  SpO2: 100% (01-28-24 @ 11:57) (100% - 100%)    Gen: NAD, well-appearing, AAOx3   Abd: Soft, gravid  Ext: non-tender, non-edematous  SVE: 3cm, 60%, -3    Bedside sono: vertex, anterior placenta  FHT: 120 bpm baseline, moderate variability, + accelerations, - decels, reactive Cat 1 tracing  [place]: variable, q2min

## 2024-01-28 NOTE — OB PROVIDER LABOR PROGRESS NOTE - ASSESSMENT
FHT category I  C/w pitocin  Will AROM when head well applied FHT category I  Exam unchanged from 1630. Will continue to uptitrate pitocin.   Will AROM when head well applied

## 2024-01-28 NOTE — OB PROVIDER LABOR PROGRESS NOTE - ASSESSMENT
Cat 1 tracing  Patient here in early labor  Will augment with Pitocin  continue POCT q4 due to GDMA2  continuous monitoring    D/w Dr. Baum

## 2024-01-28 NOTE — OB PROVIDER H&P - ATTENDING COMMENTS
27 yo 38+3 with h/o GDMA2 admitted in labor, interested in getting epidural in future. GBS negative.

## 2024-01-28 NOTE — OB PROVIDER H&P - NSICDXPASTMEDICALHX_GEN_ALL_CORE_FT
PAST MEDICAL HISTORY:  Gall stones     Gestational diabetes     History of cholestasis during pregnancy

## 2024-01-29 LAB
GLUCOSE BLDC GLUCOMTR-MCNC: 81 MG/DL — SIGNIFICANT CHANGE UP (ref 70–99)
GLUCOSE BLDC GLUCOMTR-MCNC: 86 MG/DL — SIGNIFICANT CHANGE UP (ref 70–99)
GLUCOSE BLDC GLUCOMTR-MCNC: 99 MG/DL — SIGNIFICANT CHANGE UP (ref 70–99)
T PALLIDUM AB TITR SER: NEGATIVE — SIGNIFICANT CHANGE UP

## 2024-01-29 PROCEDURE — 88302 TISSUE EXAM BY PATHOLOGIST: CPT | Mod: 26

## 2024-01-29 RX ORDER — MAGNESIUM HYDROXIDE 400 MG/1
30 TABLET, CHEWABLE ORAL
Refills: 0 | Status: DISCONTINUED | OUTPATIENT
Start: 2024-01-29 | End: 2024-01-31

## 2024-01-29 RX ORDER — LANOLIN
1 OINTMENT (GRAM) TOPICAL EVERY 6 HOURS
Refills: 0 | Status: DISCONTINUED | OUTPATIENT
Start: 2024-01-29 | End: 2024-01-31

## 2024-01-29 RX ORDER — KETOROLAC TROMETHAMINE 30 MG/ML
30 SYRINGE (ML) INJECTION ONCE
Refills: 0 | Status: DISCONTINUED | OUTPATIENT
Start: 2024-01-29 | End: 2024-01-29

## 2024-01-29 RX ORDER — IBUPROFEN 200 MG
600 TABLET ORAL EVERY 6 HOURS
Refills: 0 | Status: DISCONTINUED | OUTPATIENT
Start: 2024-01-29 | End: 2024-01-31

## 2024-01-29 RX ORDER — DIPHENHYDRAMINE HCL 50 MG
25 CAPSULE ORAL EVERY 6 HOURS
Refills: 0 | Status: DISCONTINUED | OUTPATIENT
Start: 2024-01-29 | End: 2024-01-31

## 2024-01-29 RX ORDER — BENZOCAINE 10 %
1 GEL (GRAM) MUCOUS MEMBRANE EVERY 6 HOURS
Refills: 0 | Status: DISCONTINUED | OUTPATIENT
Start: 2024-01-29 | End: 2024-01-31

## 2024-01-29 RX ORDER — IBUPROFEN 200 MG
600 TABLET ORAL EVERY 6 HOURS
Refills: 0 | Status: COMPLETED | OUTPATIENT
Start: 2024-01-29 | End: 2024-12-27

## 2024-01-29 RX ORDER — ACETAMINOPHEN 500 MG
975 TABLET ORAL
Refills: 0 | Status: DISCONTINUED | OUTPATIENT
Start: 2024-01-29 | End: 2024-01-31

## 2024-01-29 RX ORDER — AER TRAVELER 0.5 G/1
1 SOLUTION RECTAL; TOPICAL EVERY 4 HOURS
Refills: 0 | Status: DISCONTINUED | OUTPATIENT
Start: 2024-01-29 | End: 2024-01-31

## 2024-01-29 RX ORDER — TETANUS TOXOID, REDUCED DIPHTHERIA TOXOID AND ACELLULAR PERTUSSIS VACCINE, ADSORBED 5; 2.5; 8; 8; 2.5 [IU]/.5ML; [IU]/.5ML; UG/.5ML; UG/.5ML; UG/.5ML
0.5 SUSPENSION INTRAMUSCULAR ONCE
Refills: 0 | Status: DISCONTINUED | OUTPATIENT
Start: 2024-01-29 | End: 2024-01-31

## 2024-01-29 RX ORDER — SODIUM CHLORIDE 9 MG/ML
1000 INJECTION, SOLUTION INTRAVENOUS ONCE
Refills: 0 | Status: COMPLETED | OUTPATIENT
Start: 2024-01-29 | End: 2024-01-29

## 2024-01-29 RX ORDER — OXYCODONE HYDROCHLORIDE 5 MG/1
5 TABLET ORAL ONCE
Refills: 0 | Status: DISCONTINUED | OUTPATIENT
Start: 2024-01-29 | End: 2024-01-31

## 2024-01-29 RX ORDER — HYDROCORTISONE 1 %
1 OINTMENT (GRAM) TOPICAL EVERY 6 HOURS
Refills: 0 | Status: DISCONTINUED | OUTPATIENT
Start: 2024-01-29 | End: 2024-01-31

## 2024-01-29 RX ORDER — OXYCODONE HYDROCHLORIDE 5 MG/1
5 TABLET ORAL
Refills: 0 | Status: DISCONTINUED | OUTPATIENT
Start: 2024-01-29 | End: 2024-01-31

## 2024-01-29 RX ORDER — SIMETHICONE 80 MG/1
80 TABLET, CHEWABLE ORAL EVERY 4 HOURS
Refills: 0 | Status: DISCONTINUED | OUTPATIENT
Start: 2024-01-29 | End: 2024-01-31

## 2024-01-29 RX ORDER — PRAMOXINE HYDROCHLORIDE 150 MG/15G
1 AEROSOL, FOAM RECTAL EVERY 4 HOURS
Refills: 0 | Status: DISCONTINUED | OUTPATIENT
Start: 2024-01-29 | End: 2024-01-31

## 2024-01-29 RX ORDER — OXYTOCIN 10 UNIT/ML
41.67 VIAL (ML) INJECTION
Qty: 20 | Refills: 0 | Status: DISCONTINUED | OUTPATIENT
Start: 2024-01-29 | End: 2024-01-31

## 2024-01-29 RX ORDER — DIBUCAINE 1 %
1 OINTMENT (GRAM) RECTAL EVERY 6 HOURS
Refills: 0 | Status: DISCONTINUED | OUTPATIENT
Start: 2024-01-29 | End: 2024-01-31

## 2024-01-29 RX ORDER — SODIUM CHLORIDE 9 MG/ML
3 INJECTION INTRAMUSCULAR; INTRAVENOUS; SUBCUTANEOUS EVERY 8 HOURS
Refills: 0 | Status: DISCONTINUED | OUTPATIENT
Start: 2024-01-29 | End: 2024-01-30

## 2024-01-29 RX ADMIN — SODIUM CHLORIDE 125 MILLILITER(S): 9 INJECTION, SOLUTION INTRAVENOUS at 18:30

## 2024-01-29 RX ADMIN — OXYCODONE HYDROCHLORIDE 5 MILLIGRAM(S): 5 TABLET ORAL at 22:08

## 2024-01-29 RX ADMIN — Medication 975 MILLIGRAM(S): at 22:05

## 2024-01-29 RX ADMIN — Medication 30 MILLIGRAM(S): at 12:58

## 2024-01-29 RX ADMIN — Medication 15 MILLILITER(S): at 16:02

## 2024-01-29 RX ADMIN — Medication 600 MILLIGRAM(S): at 23:55

## 2024-01-29 RX ADMIN — Medication 975 MILLIGRAM(S): at 21:22

## 2024-01-29 RX ADMIN — Medication 125 MILLIUNIT(S)/MIN: at 11:53

## 2024-01-29 RX ADMIN — SODIUM CHLORIDE 1000 MILLILITER(S): 9 INJECTION, SOLUTION INTRAVENOUS at 03:49

## 2024-01-29 RX ADMIN — Medication 30 MILLIGRAM(S): at 17:44

## 2024-01-29 NOTE — OB PROVIDER LABOR PROGRESS NOTE - ASSESSMENT
category II tracing  positional changes done, IV bolus, Pitocin decreased and if still category II tracing will discontinue pitocin  prepare for delivery   monitor for signs of chorioamnionitis

## 2024-01-29 NOTE — OB PROVIDER LABOR PROGRESS NOTE - ASSESSMENT
FHT category I  Reyna regularly on pitocin  S/p AROM, clear  SVE when feeling pressure FHT category I  Reyna regularly on pitocin  S/p AROM, clear  SVE when feeling pressure      MD1- Dr Nielsen  FHR cat1, currently on pitocin  AROM ,clear fluid  Trial of labor

## 2024-01-29 NOTE — OB PROVIDER DELIVERY SUMMARY - NSSELHIDDEN_OBGYN_ALL_OB_FT
[NS_DeliveryAttending1_OBGYN_ALL_OB_FT:[id number]],[NS_DeliveryRN_OBGYN_ALL_OB_FT:[id number]=],[NS_DeliveryAssist1_OBGYN_ALL_OB_FT:[id number]=]

## 2024-01-29 NOTE — BRIEF OPERATIVE NOTE - OPERATION/FINDINGS
Omental adhesions to anterior abdominal wall, large uterus, normal appearing fallopian tubes, small left paratubal simple cyst < 1 cm, normal appearing ovaries

## 2024-01-29 NOTE — OB RN INTRAOPERATIVE NOTE - NSSELHIDDEN_OBGYN_ALL_OB_FT
[NS_DeliveryAttending1_OBGYN_ALL_OB_FT:[id number]],[NS_DeliveryRN_OBGYN_ALL_OB_FT:[id number]=],[NS_DeliveryAssist1_OBGYN_ALL_OB_FT:[id number]=]

## 2024-01-29 NOTE — OB PROVIDER LABOR PROGRESS NOTE - NS_OBIHIFHRDETAILS_OBGYN_ALL_OB_FT
130, moderate variability, positive accelerations, no decelerations, cat1
120 bpm, moderate variability, +accels, -deccels
130bpm mod variability +accels -decels
140, mod carlos, +accels, occ carlos decels
130bpm mod variability +accels -decels
baseline 150s, moderate variability, multiple late decelerations

## 2024-01-29 NOTE — OB PROVIDER LABOR PROGRESS NOTE - NS_SUBJECTIVE/OBJECTIVE_OBGYN_ALL_OB_FT
Patient with rectal pressure  SVE unchanged, cont pit  Pending top off  Cat 1 tracing
Reexamined
Patient feeling pain  VE 6-7 cm/ 70%/0 with caput
Patient seen at bedside.
Resting comfortably with epidural in place

## 2024-01-29 NOTE — OB PROVIDER LABOR PROGRESS NOTE - NSVAGINALEXAM_OBGYN_ALL_OB_DT
29-Jan-2024 08:09
29-Jan-2024 01:59
29-Jan-2024 06:08
29-Jan-2024 09:55
28-Jan-2024 21:47
28-Jan-2024 16:25

## 2024-01-29 NOTE — OB RN DELIVERY SUMMARY - NS_SEPSISRSKCALC_OBGYN_ALL_OB_FT
EOS calculated successfully. EOS Risk Factor: 0.5/1000 live births ([facility] national incidence); GA=39w3d; Temp=98.8; ROM=9.883; GBS='Negative'; Antibiotics='No antibiotics or any antibiotics < 2 hrs prior to birth'

## 2024-01-29 NOTE — OB RN DELIVERY SUMMARY - NSSELHIDDEN_OBGYN_ALL_OB_FT
[NS_DeliveryAttending1_OBGYN_ALL_OB_FT:[id number]],[NS_DeliveryRN_OBGYN_ALL_OB_FT:[id number]] [NS_DeliveryAttending1_OBGYN_ALL_OB_FT:[id number]=],[NS_DeliveryRN_OBGYN_ALL_OB_FT:[id number]=],[NS_DeliveryAssist1_OBGYN_ALL_OB_FT:[id number]=]

## 2024-01-29 NOTE — OB PROVIDER LABOR PROGRESS NOTE - ASSESSMENT
Plan  -Category 2 tracing, overall areactuive and reassuring  -Continuous FHR and toco monitoring  -SVE prn  -C/w Pit

## 2024-01-29 NOTE — OB RN INTRAOPERATIVE NOTE - NSOBSELHIDDEN_OBGYN_ALL_OB_FT
[NSOBAttendingProcedure1_OBGYN_ALL_OB_FT:[id number]],[NSRNCirculatorProcedure1_OBGYN_ALL_OB_FT:[id number]=]

## 2024-01-29 NOTE — OB PROVIDER DELIVERY SUMMARY - NSPROVIDERDELIVERYNOTE_OBGYN_ALL_OB_FT
Spontaneous vaginal delivery of liveborn male infant from HOA position. Head, shoulders and body delivered easily. Nucal/body cord x 1, delivered through with ease. Infant was suctioned and handed off to mother. Placenta delivered intact with a 3 vessel cord. Fundal massage was given and uterine fundus was found to be firm. Vaginal exam revealed an intact cervix, vaginal walls and sulci. No laceration was noted. Excellent hemostasis noted. Patient was stable. Count was correct x2. Spontaneous vaginal delivery of liveborn male infant from HOA position. Head, shoulders and body delivered easily. Nuchal/body cord x 1, delivered through with ease. Infant was suctioned and handed off to mother. Placenta delivered intact with a 3 vessel cord. Fundal massage was given and uterine fundus was found to be firm. Vaginal exam revealed an intact cervix, vaginal walls and sulci. No laceration was noted. Excellent hemostasis noted. Patient was stable. Count was correct x2.

## 2024-01-29 NOTE — CHART NOTE - NSCHARTNOTEFT_GEN_A_CORE
Consented by attending Dr. Baum.    Discussed risk Consented by attending Dr. Baum for bilateral salpingectomy (Medicaid consent signed by johnnie 11/28/23). Discussed risks, benefits, alternatives. She is through childbearing and understands a bilateral salpingectomy is a permanent sterilization procedure and will leave her unable to conceive without the assistance of a reproductive specialist. She desires the procedure and permanent sterilization, declines other forms of contraception.     She has no surgical history and no umbilical hernia. Epidural still in place providing effective anesthesia. Consented by attending Dr. Baum for bilateral salpingectomy (Medicaid consent signed by patient 11/28/23). Discussed risks, benefits, alternatives. She is through childbearing and understands a bilateral salpingectomy is a permanent sterilization procedure and will leave her unable to conceive without the assistance of a reproductive specialist. She desires the procedure and permanent sterilization, declines other forms of contraception.     She has no surgical history and no umbilical hernia. Epidural still in place providing effective anesthesia.

## 2024-01-30 ENCOUNTER — TRANSCRIPTION ENCOUNTER (OUTPATIENT)
Age: 29
End: 2024-01-30

## 2024-01-30 ENCOUNTER — APPOINTMENT (OUTPATIENT)
Dept: ANTEPARTUM | Facility: CLINIC | Age: 29
End: 2024-01-30

## 2024-01-30 PROBLEM — Z87.59 PERSONAL HISTORY OF OTHER COMPLICATIONS OF PREGNANCY, CHILDBIRTH AND THE PUERPERIUM: Chronic | Status: ACTIVE | Noted: 2024-01-28

## 2024-01-30 PROBLEM — O24.419 GESTATIONAL DIABETES MELLITUS IN PREGNANCY, UNSPECIFIED CONTROL: Chronic | Status: ACTIVE | Noted: 2024-01-28

## 2024-01-30 LAB
HCT VFR BLD CALC: 30.9 % — LOW (ref 34.5–45)
HGB BLD-MCNC: 9.9 G/DL — LOW (ref 11.5–15.5)

## 2024-01-30 RX ORDER — IBUPROFEN 200 MG
1 TABLET ORAL
Qty: 0 | Refills: 0 | DISCHARGE
Start: 2024-01-30

## 2024-01-30 RX ORDER — ACETAMINOPHEN 500 MG
3 TABLET ORAL
Qty: 0 | Refills: 0 | DISCHARGE
Start: 2024-01-30

## 2024-01-30 RX ADMIN — Medication 600 MILLIGRAM(S): at 12:04

## 2024-01-30 RX ADMIN — MAGNESIUM HYDROXIDE 30 MILLILITER(S): 400 TABLET, CHEWABLE ORAL at 10:32

## 2024-01-30 RX ADMIN — Medication 600 MILLIGRAM(S): at 12:44

## 2024-01-30 RX ADMIN — Medication 975 MILLIGRAM(S): at 03:38

## 2024-01-30 RX ADMIN — Medication 600 MILLIGRAM(S): at 18:32

## 2024-01-30 RX ADMIN — Medication 600 MILLIGRAM(S): at 23:55

## 2024-01-30 RX ADMIN — Medication 600 MILLIGRAM(S): at 05:32

## 2024-01-30 RX ADMIN — Medication 975 MILLIGRAM(S): at 21:07

## 2024-01-30 RX ADMIN — Medication 975 MILLIGRAM(S): at 09:34

## 2024-01-30 RX ADMIN — SIMETHICONE 80 MILLIGRAM(S): 80 TABLET, CHEWABLE ORAL at 10:33

## 2024-01-30 RX ADMIN — Medication 975 MILLIGRAM(S): at 15:04

## 2024-01-30 RX ADMIN — Medication 1 TABLET(S): at 12:04

## 2024-01-30 RX ADMIN — Medication 600 MILLIGRAM(S): at 19:17

## 2024-01-30 RX ADMIN — Medication 975 MILLIGRAM(S): at 08:42

## 2024-01-30 RX ADMIN — Medication 975 MILLIGRAM(S): at 15:34

## 2024-01-30 NOTE — DISCHARGE NOTE OB - PATIENT PORTAL LINK FT
You can access the FollowMyHealth Patient Portal offered by [facility] by registering at the following website: [email or web address]. By joining [employer]’s FollowMyHealth portal, you will also be able to view your health information using other applications (apps) compatible with our system.

## 2024-01-30 NOTE — ED ADULT NURSE NOTE - NS ED NOTE  TALK SOMEONE YN
She has completed 10 out of 25 planned radiotherapy treatments to chest.    Her breathing stable and she is recovering nicely from Covid.     She reports new Right wrist pain x 1 week helped with wrap, no edema.   She also reports presence of multiple tender but non-pruritic sub cm dry scalp nodules.     Plan:   Continue xrt to chest.   Radiograph Right Wrist.   Dermatology eval/bx scalp nodules.    No

## 2024-01-30 NOTE — DISCHARGE NOTE OB - PLAN OF CARE
1) Please take ibuprofen as needed for pain as prescribed.  2) Nothing in the vagina for 6 weeks (including no sex, no tampons, and no douching).  3) Please call your doctor for a follow up your postpartum appointment in 4-6 weeks.  4) Please continue taking vitamins postpartum. Take iron and colace for acute blood loss anemia.  5) Please call the office sooner if you have heavy vaginal bleeding, severe abdominal pain, or fever > 100.4F.  6) You may resume regular daily activity as tolerated

## 2024-01-30 NOTE — PROGRESS NOTE ADULT - SUBJECTIVE AND OBJECTIVE BOX
Postpartum Note,   Patient is a [id number]    Subjective: Patient seen and examined at bedside. No acute events overnight. Pain well controlled with current pain regimen. She is ambulating well and tolerating PO diet/fluids. She reports normal postpartum bleeding, having used 3 pads over the last 24 hrs. She is voiding well and reports flatus, although has not yet had a BM. Reports bottle feeding and breastfeeding without difficulties. Denies fever, headache, changes in vision, chest pain, SOB, nausea, vomiting. Otherwise, patient feels well without additional complaints.     Physical Exam:    Vital Signs Last 24 Hrs  T(C): 36.8 (2024 05:00), Max: 37.2 (2024 23:27)  T(F): 98.3 (2024 05:00), Max: 98.9 (2024 23:27)  HR: 88 (2024 05:00) (74 - 111)  BP: 104/69 (2024 05:00) (95/54 - 140/56)  BP(mean): --  RR: 18 (2024 05:00) (16 - 18)  SpO2: 97% (2024 05:00) (97% - 100%)    Parameters below as of 2024 05:00  Patient On (Oxygen Delivery Method): room air        Gen: NAD  Breast: Soft, nontender, not engorged  Cardio: S1,S2 no murmurs  Lungs: CTA B/L, no wheezing  Abdomen: Soft, nontender, no distension, firm uterine fundus at umbilicus  Pelvic: Normal lochia noted  Ext: No calf tenderness bilaterally    LABS:                        13.0   10.33 )-----------( 340      ( 2024 12:50 )             39.5         Allergies    No Known Allergies    Intolerances      MEDICATIONS  (STANDING):  acetaminophen     Tablet .. 975 milliGRAM(s) Oral <User Schedule>  diphtheria/tetanus/pertussis (acellular) Vaccine (Adacel) 0.5 milliLiter(s) IntraMuscular once  ibuprofen  Tablet. 600 milliGRAM(s) Oral every 6 hours  oxytocin Infusion 333.333 milliUNIT(s)/Min (1000 mL/Hr) IV Continuous <Continuous>  oxytocin Infusion 41.667 milliUNIT(s)/Min (125 mL/Hr) IV Continuous <Continuous>  prenatal multivitamin 1 Tablet(s) Oral daily  sodium chloride 0.9% lock flush 3 milliLiter(s) IV Push every 8 hours    MEDICATIONS  (PRN):  benzocaine 20%/menthol 0.5% Spray 1 Spray(s) Topical every 6 hours PRN for Perineal discomfort  dibucaine 1% Ointment 1 Application(s) Topical every 6 hours PRN Perineal discomfort  diphenhydrAMINE 25 milliGRAM(s) Oral every 6 hours PRN Pruritus  hydrocortisone 1% Cream 1 Application(s) Topical every 6 hours PRN Moderate Pain (4-6)  lanolin Ointment 1 Application(s) Topical every 6 hours PRN nipple soreness  magnesium hydroxide Suspension 30 milliLiter(s) Oral two times a day PRN Constipation  oxyCODONE    IR 5 milliGRAM(s) Oral once PRN Moderate to Severe Pain (4-10)  oxyCODONE    IR 5 milliGRAM(s) Oral every 3 hours PRN Moderate to Severe Pain (4-10)  pramoxine 1%/zinc 5% Cream 1 Application(s) Topical every 4 hours PRN Moderate Pain (4-6)  simethicone 80 milliGRAM(s) Chew every 4 hours PRN Gas  witch hazel Pads 1 Application(s) Topical every 4 hours PRN Perineal discomfort        Assessment and Plan  Patient is a [id number] s/p  and bilateral salpingectomy PPD 1.  - VSS  - Hgb stable  - Routine post-partum care.  - Pain well controlled, continue current pain regimen.  - Encouraged ambulation.  - Encouraged PO diet/fluids.  - Encouraged breastfeeding. Postpartum Note,   Patient is a [id number]    Subjective: Patient seen and examined at bedside. No acute events overnight. Pain well controlled with current pain regimen. She is ambulating well and tolerating PO diet/fluids. She reports normal postpartum bleeding, having used 3 pads over the last 24 hrs. She is voiding well and reports flatus, although has not yet had a BM. Reports bottle feeding and breastfeeding without difficulties. Denies fever, headache, changes in vision, chest pain, SOB, nausea, vomiting. Otherwise, patient feels well without additional complaints.     Physical Exam:    Vital Signs Last 24 Hrs  T(C): 36.8 (2024 05:00), Max: 37.2 (2024 23:27)  T(F): 98.3 (2024 05:00), Max: 98.9 (2024 23:27)  HR: 88 (2024 05:00) (74 - 111)  BP: 104/69 (2024 05:00) (95/54 - 140/56)  BP(mean): --  RR: 18 (2024 05:00) (16 - 18)  SpO2: 97% (2024 05:00) (97% - 100%)    Parameters below as of 2024 05:00  Patient On (Oxygen Delivery Method): room air        Gen: NAD  Breast: Soft, nontender, not engorged  Cardio: S1,S2 no murmurs  Lungs: CTA B/L, no wheezing  Abdomen: Soft, nontender, no distension, firm uterine fundus at umbilicus  Pelvic: Normal lochia noted  Ext: No calf tenderness bilaterally    LABS:                        13.0   10.33 )-----------( 340      ( 2024 12:50 )             39.5         Allergies    No Known Allergies    Intolerances      MEDICATIONS  (STANDING):  acetaminophen     Tablet .. 975 milliGRAM(s) Oral <User Schedule>  diphtheria/tetanus/pertussis (acellular) Vaccine (Adacel) 0.5 milliLiter(s) IntraMuscular once  ibuprofen  Tablet. 600 milliGRAM(s) Oral every 6 hours  oxytocin Infusion 333.333 milliUNIT(s)/Min (1000 mL/Hr) IV Continuous <Continuous>  oxytocin Infusion 41.667 milliUNIT(s)/Min (125 mL/Hr) IV Continuous <Continuous>  prenatal multivitamin 1 Tablet(s) Oral daily  sodium chloride 0.9% lock flush 3 milliLiter(s) IV Push every 8 hours    MEDICATIONS  (PRN):  benzocaine 20%/menthol 0.5% Spray 1 Spray(s) Topical every 6 hours PRN for Perineal discomfort  dibucaine 1% Ointment 1 Application(s) Topical every 6 hours PRN Perineal discomfort  diphenhydrAMINE 25 milliGRAM(s) Oral every 6 hours PRN Pruritus  hydrocortisone 1% Cream 1 Application(s) Topical every 6 hours PRN Moderate Pain (4-6)  lanolin Ointment 1 Application(s) Topical every 6 hours PRN nipple soreness  magnesium hydroxide Suspension 30 milliLiter(s) Oral two times a day PRN Constipation  oxyCODONE    IR 5 milliGRAM(s) Oral once PRN Moderate to Severe Pain (4-10)  oxyCODONE    IR 5 milliGRAM(s) Oral every 3 hours PRN Moderate to Severe Pain (4-10)  pramoxine 1%/zinc 5% Cream 1 Application(s) Topical every 4 hours PRN Moderate Pain (4-6)  simethicone 80 milliGRAM(s) Chew every 4 hours PRN Gas  witch hazel Pads 1 Application(s) Topical every 4 hours PRN Perineal discomfort

## 2024-01-30 NOTE — DISCHARGE NOTE OB - NS MD DC FALL RISK RISK
For information on Fall & Injury Prevention, visit: [email or web address] OR  [email or web address] OR  https://www.cdc.gov/steadi/patient.html

## 2024-01-30 NOTE — DISCHARGE NOTE OB - MEDICATION SUMMARY - MEDICATIONS TO STOP TAKING
I will STOP taking the medications listed below when I get home from the hospital:    cephalexin 500 mg oral tablet  -- 1 tab(s) by mouth 4 times a day   -- Finish all this medication unless otherwise directed by prescriber.    amoxicillin-clavulanate 500 mg-125 mg oral tablet  -- 1 tab(s) by mouth every 12 hours   -- Finish all this medication unless otherwise directed by prescriber.  Take with food or milk.    Keflex 500 mg oral capsule  -- 1 cap(s) by mouth every 6 hours   -- Finish all this medication unless otherwise directed by prescriber.    Macrobid 100 mg oral capsule  -- 1 cap(s) by mouth 2 times a day   -- Finish all this medication unless otherwise directed by prescriber.  May discolor urine or feces.  Take with food or milk.    Pepcid 20 mg oral tablet  -- 1 tab(s) by mouth 2 times a day   -- It is very important that you take or use this exactly as directed.  Do not skip doses or discontinue unless directed by your doctor.  Obtain medical advice before taking any non-prescription drugs as some may affect the action of this medication.    omeprazole 40 mg oral delayed release capsule  -- 1 cap(s) by mouth once a day   -- It is very important that you take or use this exactly as directed.  Do not skip doses or discontinue unless directed by your doctor.  Obtain medical advice before taking any non-prescription drugs as some may affect the action of this medication.  Swallow whole.  Do not crush.    oxyCODONE 5 mg oral tablet  -- 1 tab(s) by mouth 3 times a day as needed for severe pain MDD:3  -- Caution federal law prohibits the transfer of this drug to any person other  than the person for whom it was prescribed.  It is very important that you take or use this exactly as directed.  Do not skip doses or discontinue unless directed by your doctor.  May cause drowsiness or dizziness.  This prescription cannot be refilled.  Using more of this medication than prescribed may cause serious breathing problems.    Pyridium 200 mg oral tablet  -- 1 tab(s) by mouth 3 times a day (after meals)   -- May discolor urine or feces.  Medication should be taken with plenty of water.  Take with food or milk.    Percocet 5/325 oral tablet  -- 1-2 tab(s) by mouth every 4- 6 hours, As Needed -for moderate pain MDD:4gm   -- Caution federal law prohibits the transfer of this drug to any person other  than the person for whom it was prescribed.  May cause drowsiness.  Alcohol may intensify this effect.  Use care when operating dangerous machinery.  This prescription cannot be refilled.  This product contains acetaminophen.  Do not use  with any other product containing acetaminophen to prevent possible liver damage.  Using more of this medication than prescribed may cause serious breathing problems.

## 2024-01-30 NOTE — DISCHARGE NOTE OB - MEDICATION SUMMARY - MEDICATIONS TO TAKE
I will START or STAY ON the medications listed below when I get home from the hospital:    ibuprofen 600 mg oral tablet  -- 1 tab(s) by mouth every 6 hours as needed for  mild pain  -- Indication: For Pain    acetaminophen 325 mg oral tablet  -- 3 tab(s) by mouth once a day as needed for  moderate pain  -- Indication: For Pain    metFORMIN 500 mg oral tablet  -- 1 tab(s) by mouth once a day (at bedtime)  -- Indication: For T2DM    Prenatal Multivitamins oral tablet  -- 1 tab(s) by mouth once a day  -- Indication: For Vitamin

## 2024-01-30 NOTE — DISCHARGE NOTE OB - MEDICATION SUMMARY - MEDICATIONS TO CHANGE
I will SWITCH the dose or number of times a day I take the medications listed below when I get home from the hospital:    Carafate 1 g oral tablet  -- 1 tab(s) by mouth 3 times a day (after meals)   -- Do not take dairy products, antacids, or iron preparations within one hour of this medication.  It is very important that you take or use this exactly as directed.  Do not skip doses or discontinue unless directed by your doctor.  Take medication on an empty stomach 1 hour before or 2 to 3 hours after a meal unless otherwise directed by your doctor.    dicyclomine 10 mg oral capsule  -- 1 cap(s) by mouth every 6 hours   -- May cause drowsiness.  Alcohol may intensify this effect.  Use care when operating dangerous machinery.    Prenatal Multivitamins oral tablet  -- 1 tab(s) by mouth once a day    metFORMIN 500 mg oral tablet  -- 1 tab(s) by mouth once a day (at bedtime)    pantoprazole 40 mg oral delayed release tablet  -- 1 tab(s) by mouth once a day   -- It is very important that you take or use this exactly as directed.  Do not skip doses or discontinue unless directed by your doctor.  Obtain medical advice before taking any non-prescription drugs as some may affect the action of this medication.  Swallow whole.  Do not crush.

## 2024-01-30 NOTE — DISCHARGE NOTE OB - CARE PLAN
1 Principal Discharge DX:	 (normal spontaneous vaginal delivery)  Assessment and plan of treatment:	1) Please take ibuprofen as needed for pain as prescribed.  2) Nothing in the vagina for 6 weeks (including no sex, no tampons, and no douching).  3) Please call your doctor for a follow up your postpartum appointment in 4-6 weeks.  4) Please continue taking vitamins postpartum. Take iron and colace for acute blood loss anemia.  5) Please call the office sooner if you have heavy vaginal bleeding, severe abdominal pain, or fever > 100.4F.  6) You may resume regular daily activity as tolerated

## 2024-01-30 NOTE — DISCHARGE NOTE OB - CARE PROVIDER_API CALL
Viviana Medrano  Alan [facility]  [address], [place] [address]  Phone: [phone]  Fax: (   )    -  Follow Up Time: 1 week

## 2024-01-30 NOTE — PROGRESS NOTE ADULT - ATTENDING COMMENTS
MD 1 Note    Pt seen and examined.  Agree with note above.  Pt doing well and is stable.  Abd: minilap incision c / d / i  Routine PP care.      PLEE

## 2024-01-30 NOTE — DISCHARGE NOTE OB - PROVIDER TOKENS
FREE:[LAST:[Marcela],FIRST:[Viviana],PHONE:[phone]],FAX:[(   )    -],ADDRESS:[[place] [address]],FOLLOWUP:[1 week]]

## 2024-01-31 VITALS
OXYGEN SATURATION: 98 % | SYSTOLIC BLOOD PRESSURE: 111 MMHG | HEART RATE: 78 BPM | TEMPERATURE: 97 F | RESPIRATION RATE: 17 BRPM | DIASTOLIC BLOOD PRESSURE: 65 MMHG

## 2024-01-31 RX ADMIN — Medication 975 MILLIGRAM(S): at 08:45

## 2024-01-31 RX ADMIN — Medication 975 MILLIGRAM(S): at 03:18

## 2024-01-31 RX ADMIN — Medication 600 MILLIGRAM(S): at 12:25

## 2024-01-31 RX ADMIN — Medication 975 MILLIGRAM(S): at 09:18

## 2024-01-31 RX ADMIN — Medication 1 TABLET(S): at 11:43

## 2024-01-31 RX ADMIN — Medication 600 MILLIGRAM(S): at 11:43

## 2024-01-31 RX ADMIN — Medication 600 MILLIGRAM(S): at 06:37

## 2024-01-31 NOTE — PROGRESS NOTE ADULT - ASSESSMENT
SUDHIR JACKSON is a 28y  now PPD#2 s/p spontaneous vaginal delivery at 38w3d gestation, complicated by GDM2.      A/P:    -Vital signs stable  -Hgb: 13 -> 9.9   -Voiding, tolerating PO  -Advance care as tolerated   -Continue routine postpartum care and education  -Dispo: Anticipate discharge to home pending attending approval.

## 2024-01-31 NOTE — PROGRESS NOTE ADULT - SUBJECTIVE AND OBJECTIVE BOX
SUDHIR JACKSON is a 28y  now PPD#2 s/p spontaneous vaginal delivery at 38w3d gestation, complicated by GDM2.    S:    No acute events overnight.   The patient has no complaints.  Pain controlled with current treatment regimen.   She is ambulating without difficulty and tolerating PO.   + flatus/-BM/+ voiding   She endorses appropriate lochia, which is decreasing.   She is breastfeeding without difficulty.   She denies fevers, chills, nausea and vomiting.   She denies lightheadedness, dizziness, palpitations, chest pain and SOB.     O:    T(C): 36.7 (24 @ 20:00), Max: 36.7 (24 @ 20:00)  HR: 77 (24 @ 20:00) (77 - 92)  BP: 107/59 (24 @ 20:00) (107/59 - 123/65)  RR: 18 (24 @ 20:00) (18 - 18)  SpO2: 100% (24 @ 20:00) (98% - 100%)    Gen: NAD, AOx3, resting comfortably on room air   Abdomen:  Soft, non-tender, non-distended  Uterus:  Fundus firm below umbilicus  VE:  Expected lochia  Ext:  b/l LE non-tender                           9.9    x     )-----------( x        ( 2024 08:12 )             30.9

## 2024-02-01 LAB — SURGICAL PATHOLOGY STUDY: SIGNIFICANT CHANGE UP

## 2024-02-02 ENCOUNTER — APPOINTMENT (OUTPATIENT)
Age: 29
End: 2024-02-02
Payer: MEDICAID

## 2024-02-02 PROCEDURE — S9445: CPT

## 2024-02-05 DIAGNOSIS — Z3A.38 38 WEEKS GESTATION OF PREGNANCY: ICD-10-CM

## 2024-02-05 DIAGNOSIS — Z90.49 ACQUIRED ABSENCE OF OTHER SPECIFIED PARTS OF DIGESTIVE TRACT: ICD-10-CM

## 2024-02-05 DIAGNOSIS — E66.9 OBESITY, UNSPECIFIED: ICD-10-CM

## 2024-04-05 ENCOUNTER — NON-APPOINTMENT (OUTPATIENT)
Age: 29
End: 2024-04-05

## 2024-06-09 ENCOUNTER — APPOINTMENT (OUTPATIENT)
Dept: ULTRASOUND IMAGING | Facility: CLINIC | Age: 29
End: 2024-06-09
Payer: MEDICAID

## 2024-06-09 ENCOUNTER — OUTPATIENT (OUTPATIENT)
Dept: OUTPATIENT SERVICES | Facility: HOSPITAL | Age: 29
LOS: 1 days | End: 2024-06-09
Payer: MEDICAID

## 2024-06-09 DIAGNOSIS — Z00.8 ENCOUNTER FOR OTHER GENERAL EXAMINATION: ICD-10-CM

## 2024-06-09 DIAGNOSIS — Z90.49 ACQUIRED ABSENCE OF OTHER SPECIFIED PARTS OF DIGESTIVE TRACT: Chronic | ICD-10-CM

## 2024-06-09 PROCEDURE — 76830 TRANSVAGINAL US NON-OB: CPT | Mod: 26

## 2024-06-09 PROCEDURE — 76830 TRANSVAGINAL US NON-OB: CPT

## 2024-06-09 PROCEDURE — 76856 US EXAM PELVIC COMPLETE: CPT | Mod: 26

## 2024-06-09 PROCEDURE — 76856 US EXAM PELVIC COMPLETE: CPT

## 2024-08-06 ENCOUNTER — NON-APPOINTMENT (OUTPATIENT)
Age: 29
End: 2024-08-06

## 2024-10-03 NOTE — PROGRESS NOTE ADULT - ASSESSMENT
Assessment and Plan  Patient is a [id number] s/p  and bilateral salpingectomy PPD 1.  - VSS  - Hgb stable  - Routine post-partum care.  - Pain well controlled, continue current pain regimen.  - Encouraged ambulation.  - Encouraged PO diet/fluids.  - Encouraged breastfeeding. Saphenous Vein

## 2024-10-23 NOTE — ED ADULT NURSE NOTE - CAS ELECT INFOMATION PROVIDED
Pt's 2.5 yr old daughter has some URI symptoms and pt inquires about precautions he should take since he is neutropenic.    Discussed frequent handwashing, washing high touch surfaces in the home and not sharing food, drinks, utensils or other personal items. He is already wearing a mask at home for prolonged or close interactions with her.    He understands need to monitor his temperature and will contact us with any fever or other symptoms of illness.  
DC instructions

## 2024-11-14 NOTE — ED PROCEDURE NOTE - LACERATION NUMBER
1 Show Aperture Variable?: Yes Medical Necessity Clause: This procedure was medically necessary because the lesions that were treated were: Render Post-Care Instructions In Note?: no Detail Level: Detailed Post-Care Instructions: I reviewed with the patient in detail post-care instructions. Patient is to wear sunprotection, and avoid picking at any of the treated lesions. Pt may apply Vaseline to crusted or scabbing areas. Medical Necessity Information: It is in your best interest to select a reason for this procedure from the list below. All of these items fulfill various CMS LCD requirements except the new and changing color options. Spray Paint Text: The liquid nitrogen was applied to the skin utilizing a spray paint frosting technique. Consent: The patient's consent was obtained including but not limited to risks of crusting, scabbing, blistering, scarring, darker or lighter pigmentary change, recurrence, incomplete removal and infection. Duration Of Freeze Thaw-Cycle (Seconds): 3

## 2024-12-04 NOTE — ED ADULT NURSE NOTE - NS PRO PASSIVE SMOKE EXP
The patient has been examined and the H&P has been reviewed:    I concur with the findings and no changes have occurred since H&P was written.    Surgery risks, benefits and alternative options discussed and understood by patient/family.    Josette Altamirano is 65 y.o.  presenting for scheduled RA-TLH/BSO and other indicated procedures.    Temp:  [97.6 °F (36.4 °C)-98 °F (36.7 °C)] 98 °F (36.7 °C)  Pulse:  [74-81] 81  Resp:  [18-19] 18  SpO2:  [98 %-99 %] 98 %  BP: (135)/(68-74) 135/74    General: NAD, alert, oriented, cooperative  HEENT: NCAT, EOM grossly intact  Lungs: Normal WOB  Heart: regular rate  Abdomen: soft, nondistended, nontender, no rebound or guarding    Consents in chart. Pre-operative heparin ordered and not yet given. All questions answered and concerns addressed. To OR for planned procedure.    Akanksha Mann MD, MPH  OBGYN PGY-4          There are no hospital problems to display for this patient.     No

## 2024-12-31 NOTE — PATIENT PROFILE ADULT - HAVE YOU EXPERIENCED VIOLENCE OR A TRAUMATIC EVENT?
HPI    
HPI - General Adult    
General    
Chief complaint: Upper Respiratory Infection    
Stated complaint: URTI COMPLAINTS    
Time Seen by Provider: 12/31/24 12:41    
Source: patient    
Mode of arrival: walk-in    
Limitations: no limitations    
History of Present Illness    
HPI narrative:     
55-year-old female presents for cough and shortness of breath.  She has been   
coughing up thick green phlegm for the past day.  No fever or hemoptysis.  She   
has not taken an aerosol treatment today.  She is on home oxygen.    
Related Data    
                                Home Medications    
    
    
    
?Medication ?Instructions ?Recorded ?Confirmed    
     
budesonide 1 mg/2 mL suspension 1 mg inhalation Q12H PRN shortness 06/13/23 12/26/23    
    
for nebulization of breath      
     
budesonide-formoterol  1 inh inhalation Q12H 06/13/23 12/26/23    
    
mcg-4.5 mcg/actuation aerosol       
    
inhaler (Symbicort)       
     
famotidine 40 mg tablet 40 mg PO BEDTIME 06/13/23 12/26/23    
     
galcanezumab-gnlm 120 mg/mL 120 mg subcut .month 06/13/23 12/26/23    
    
subcutaneous pen injector       
    
(Emgality Pen)       
     
semaglutide 2 mg/dose (8 mg/3 mL) 2 mg subcut .friday 06/13/23 12/26/23    
    
subcutaneous pen injector (Ozempic)       
     
rosuvastatin 10 mg tablet 10 mg PO DAILY 12/26/23 12/26/23    
    
    
                                  Previous Rx's    
    
    
    
?Medication ?Instructions ?Recorded    
     
promethazine-DM 6.25 mg-15 mg/5 mL 5 ml PO Q6H PRN cough #118 mL 06/14/23    
    
oral syrup      
     
doxycycline hyclate 100 mg tablet 100 mg PO BID 10 days #20 tabs 12/26/23    
     
methylprednisolone 4 mg tablets in See Rx Instructions .Route 12/26/23    
    
a dose pack (Medrol (Jose)) .COMPLEX #21 ea     
     
nirmatrelvir 300 mg (150 mg See Rx Instructions PO .COMPLEX 12/31/24    
    
x2)-ritonavir 100 mg tablet,dose #30 ea     
    
pack (Paxlovid)      
    
    
    
                                    Allergies    
    
    
    
Allergy/AdvReac Type Severity Reaction Status Date / Time    
     
Penicillins Allergy Severe rash Verified 06/13/23 17:50    
     
Sulfa (Sulfonamide Allergy Severe Rash Verified 06/13/23 17:50    
    
Antibiotics)         
     
metoclopramide (From Reglan) AdvReac Severe Irritable Verified 12/26/23 12:35    
     
topiramate (From Topamax) AdvReac Severe thoughts Verified 06/13/23 17:50    
    
   of dieing      
     
bupropion (From Wellbutrin) AdvReac Intermediate Agitated Verified 12/26/23   
12:29    
    
    
    
    
Opioid HPI    
Opioid Management    
Most Recent Opioid Data:     
    
    
                Last Pain Scale 5               06/14/23 13:21  06/14/23    
    
    
    
Review of Systems    
    
    
ROS      
    
 Narrative A ten point review of systems is negative except as noted above.       
    
    
PFSH    
PFS    
Medical History (Updated 12/31/24 @ 13:36 by Konstantin Iglesias MD)    
    
Migraine    
   ?G43.909 - Migraine, unspecified, not intractable, without status migrainosus  
   (ICD-10)    
Fibromyalgia    
   ?M79.7 - Fibromyalgia (ICD-10)    
Rheumatoid arthritis    
   ?M06.9 - Rheumatoid arthritis, unspecified (ICD-10)    
History of diabetes mellitus    
   ?Z86.39 - Personal history of other endocrine, nutritional and metabolic   
   disease (ICD-10)    
History of oxygen administration    
   ?Z99.81 - Dependence on supplemental oxygen (ICD-10)    
History of COPD    
   ?Z87.09 - Personal history of other diseases of the respiratory system (ICD-  
   10)    
    
    
Surgical History (Updated 06/13/23 @ 17:58 by Juan Garcia)    
    
History of appendectomy    
   ?Z90.49 - Acquired absence of other specified parts of digestive tract (ICD-  
   10)    
History of cholecystectomy    
   ?Z90.49 - Acquired absence of other specified parts of digestive tract (ICD-  
   10)    
History of hysterectomy    
   ?Z90.710 - Acquired absence of both cervix and uterus (ICD-10)    
History of cardiac catheterization    
   ?Z98.890 - Other specified postprocedural states (ICD-10)    
    
    
Social History (Reviewed 12/26/23 @ 13:31 by ERIN Marcum)    
Smoking status:  Current every day smoker     
Highest level of school completed/degree received:  high school graduate     
    
    
    
Exam    
Narrative    
Exam Narrative:     
Nurses note and vital signs reviewed and patient is not hypoxic.    
    
General:  The patient appears in no apparent respiratory distress.      
Skin:  Warm, dry, no pallor noted.  There is no rash noted.    
Head:  Normocephalic, atraumatic    
Eye: Normal conjunctiva, no drainage    
Ears, Nose, Mouth, and Throat: oral mucosa is moist. Nares patent.     
Cardiovascular:  Regular Rate and Rhythm    
Respiratory: Bilateral rhonchi throughout    
Back:  non-tender    
GI: Soft and nontender    
Musculoskeletal: The patient has no evidence of calf tenderness, no pitting   
edema, symmetrical pulses noted bilaterally    
Neurological:  A&O, normal speech    
Psychiatric:  Cooperative    
Constitutional    
Vital Signs, click to edit/add:     
    
                                Last Vital Signs    
    
    
    
Temp  98.1 F   12/31/24 12:49    
     
Pulse  98 H  12/31/24 13:10    
     
Resp  22 H  12/31/24 12:49    
     
BP  126/85   12/31/24 12:49    
     
Pulse Ox  94 L  12/31/24 13:10    
     
O2 Del Method  Nasal Cannula  12/31/24 13:10    
     
O2 Flow Rate  4   12/31/24 13:10    
    
    
    
    
    
Course    
Vital Signs    
Vital signs:     
    
                                   Vital Signs    
    
    
    
Temperature  98.1 F   12/31/24 12:49    
     
Pulse Rate  104 H  12/31/24 12:49    
     
Respiratory Rate  22 H  12/31/24 12:49    
     
Blood Pressure  126/85   12/31/24 12:49    
     
Pulse Oximetry  94 L  12/31/24 12:49    
     
Oxygen Delivery Method  Nasal Cannula  12/31/24 12:49    
     
Oxygen Delivery Flow Rate  4   12/31/24 12:49    
    
    
                                            
    
    
    
Temperature  98.1 F   12/31/24 12:49    
     
Pulse Rate  98 H  12/31/24 13:10    
     
Respiratory Rate  22 H  12/31/24 12:49    
     
Blood Pressure  126/85   12/31/24 12:49    
     
Pulse Oximetry  94 L  12/31/24 13:10    
     
Oxygen Delivery Method  Nasal Cannula  12/31/24 13:10    
     
Oxygen Delivery Flow Rate  4   12/31/24 13:10    
    
    
    
    
    
Medical Decision Making    
MDM Narrative    
Medical decision making narrative:     
COVID test is positive.  Chest x-ray my interpretation shows no infiltrates.    
She is requesting Paxlovid and a prescription was provided.  Treatment diagnosis  
and follow-up were discussed with the patient.    
Differential Diagnosis    
Differential Diagnosis: COVID, influenza, pneumonia, URI    
Lab Data    
Lab results reviewed: Yes I reviewed the patient's lab results    
Labs:     
    
                                   Lab Results    
    
    
    
  12/31/24 Range/Units    
    
  12:53     
     
Influenza Type A Ag  Negative      
     
Influenza Type B Ag  Negative      
     
SARS-CoV-2 Ag (CV2AG)  Positive A  (NEGATIVE)      
    
    
    
    
Imaging Data    
Chest x-ray:     
      My impression:     
No acute findings    
    
Discharge Plan    
Discharge    
Chief Complaint: Upper Respiratory Infection    
    
Clinical Impression:    
 COVID-19    
    
    
Patient Disposition: Home, Self-Care    
    
Time of Disposition Decision: 13:36    
    
Condition: Good    
    
Mode of Transportation: Private Vehicle    
    
Prescriptions / Home Meds:    
New    
  Paxlovid 300 mg (150 mg x 2)-100 mg tablets,dose pack     
   See Rx Instructions  .ROUTE .COMPLEX Qty: 30 0RF    
   Rx Instructions:    
   take  mg tablets of nirmatrelvir with  mg tablet of ritonavir   
twice daily for 5 days    
    
No Action    
  rosuvastatin 10 mg tablet     
   10 mg PO DAILY     
  methylprednisolone [Medrol (Jose)] 4 mg tablets,dose pack     
   See Rx Instructions  .ROUTE .COMPLEX Qty: 21 0RF    
   Rx Instructions:    
   Taper as directed    
  doxycycline hyclate 100 mg tablet     
   100 mg PO BID 10 Days Qty: 20 0RF    
  budesonide 1 mg/2 mL suspension for nebulization     
   1 mg inhalation Q12H PRN (Reason: shortness of breath)     
  budesonide-formoterol [Symbicort] 160-4.5 mcg/actuation HFA aerosol inhaler     
   1 inh INHALATION Q12H     
  Ozempic 2 mg/dose (8 mg/3 mL) pen injector     
   2 mg SUBCUT .friday     
  famotidine 40 mg tablet     
   40 mg PO BEDTIME     
  Emgality Pen 120 mg/mL pen injector     
   120 mg SUBCUT .month     
  promethazine-DM 6.25-15 mg/5 mL syrup     
   5 ml PO Q6H PRN (Reason: cough) Qty: 118 0RF    
    
Print Language: English    
    
Instructions:  COVID-19 (Coronavirus Disease 2019) (ED), COVID-19: Slow the   
Coronavirus Spread (ED), Face Coverings (Masks) and COVID-19 (ED)    
    
Referrals:    
NELIA MALONE [Primary Care Provider] - 1 week
no

## 2025-01-10 NOTE — OB RN TRIAGE NOTE - NS_DISCHARGEPRINT_OBGYN_ALL_OB
Refill Decision Note   Sneha Taz  is requesting a refill authorization.  Brief Assessment and Rationale for Refill:  Approve     Medication Therapy Plan: flos      Comments:     Note composed:4:04 PM 01/10/2025                General OB Triage Discharge Instructions

## 2025-06-18 NOTE — PATIENT PROFILE ADULT - SURGICAL SITE DRAIN
[facility] Carmen Cain RN  
no